# Patient Record
Sex: FEMALE | Race: WHITE | Employment: FULL TIME | URBAN - METROPOLITAN AREA
[De-identification: names, ages, dates, MRNs, and addresses within clinical notes are randomized per-mention and may not be internally consistent; named-entity substitution may affect disease eponyms.]

---

## 2017-07-07 ENCOUNTER — HOSPITAL ENCOUNTER (OUTPATIENT)
Dept: MAMMOGRAPHY | Age: 57
Discharge: HOME | End: 2017-07-07
Payer: COMMERCIAL

## 2017-07-07 DIAGNOSIS — Z12.31 VISIT FOR SCREENING MAMMOGRAM: ICD-10-CM

## 2017-07-07 PROCEDURE — 77067 SCR MAMMO BI INCL CAD: CPT

## 2017-09-18 ENCOUNTER — HOSPITAL ENCOUNTER (OUTPATIENT)
Dept: PHYSICAL THERAPY | Age: 57
Discharge: HOME | End: 2017-09-18
Payer: OTHER MISCELLANEOUS

## 2017-09-18 PROCEDURE — 97165 OT EVAL LOW COMPLEX 30 MIN: CPT

## 2017-09-18 NOTE — PROGRESS NOTES
Joaquín Romo  : 1960 [facility] [place] [id number] [facility], [address], [facility] [address].  [phone]   [phone]         OUTPATIENT OCCUPATIONAL THERAPY: Initial Assessment 2017    ICD-10: Treatment Diagnosis: Pain in left hand (M79.642)Pain in left wrist (M25.532)  Precautions/Allergies:   Review of patient's allergies indicates no known allergies. Fall Risk Score: 0 (? 5 = High Risk)  MD Orders: Evaluate and treat;Stretching and strengthening left thumb and wrist MEDICAL/REFERRING DIAGNOSIS:   Radial styloid tenosynovitis (de quervain) [M65.4]   DATE OF ONSET: 2017   REFERRING PHYSICIAN: David Gunn MD  RETURN PHYSICIAN APPOINTMENT: 2017     INITIAL ASSESSMENT:  Ms. Shannen Byrnes presents with decreased functional use, strength and range of motion of her left hand, wrist and upper extremity that is affecting her independence with activities of daily living and ability to perform job tasks. I feel that Ms. Shannen Byrnes will benefit from skilled occupational therapy to maximize the functional use of her hand, wrist and upper extremity in daily activities and work tasks. PLAN OF CARE:   PROBLEM LIST:  1. Pain in left hand and wrist.  2. Decreased motion in left thumb and wrist.  3. Decreased strength in left hand. INTERVENTIONS PLANNED:  1. Modalities that may include fluidotherapy, paraffin, ultrasound, and light therapy. 2. Therapeutic exercise including a home exercise program.  3. Manual therapy. 4. Therapeutic activities. TREATMENT PLAN:  Effective Dates: 2017 TO 2017. Frequency/Duration: 2 times a week for 6 weeks  GOALS: (Goals have been discussed and agreed upon with patient.)  Short-Term Functional Goals: Time Frame: 4 weeks  1. Decrease pain to 6 to allow patient to perform self care tasks.   2. Increase motion in left thumb and wrist by 5 degrees to improve functional use of upper extremity in ADL activities. 3. Increase strength in left hand by 5 pounds to allow patient to  and lift objects during self care activities. Discharge Goals: Time Frame: 12 weeks  1. Decrease pain to 2 to allow patient to perform all household and work tasks. 2. Increase motion in left thumb and wrist by 10 degreees to allow patient to perform all ADL activities. 3. Increase strength in left hand by 8 pounds to allow patient to , lift, hold, and carry heavy objects. Rehabilitation Potential For Stated Goals: Good  Regarding Lindsay Mak therapy, I certify that the treatment plan above will be carried out by a therapist or under their direction. Thank you for this referral,  Jose Schneider, KATHY       Referring Physician Signature: Brendon Lipscomb MD _________________________  Date _________            The information in this section was collected on 9/18/2017 (except where otherwise noted). OCCUPATIONAL PROFILE & HISTORY:   History of Present Injury/Illness (Reason for Referral): The patient began having increasing pain in her left thumb and wrist over the last 5 months from doing repetitive motion at work. Past Medical History/Comorbidities:   Ms. Manuel Conrad  has a past medical history of Anxiety; Depression; Endometriosis; and Uterine fibroid. Ms. Manuel Conrad  has a past surgical history that includes negrita and bso. Social History/Living Environment:   Home Environment: Private residence  Prior Level of Function/Work/Activity:  Independent  Dominant Side:         RIGHT    Current Medications:    Current Outpatient Prescriptions:     VITAMIN E/QUININE SULFATE (LEG CRAMP TREATMENT PO), Take  by mouth daily. , Disp: , Rfl:     ORGAN CONCENTRATES (ADRENAL PO), Take  by mouth daily. , Disp: , Rfl:    Date Last Reviewed:  9/28/2017   Number of medical conditions (excluding presenting problem) that affect the Plan of Care: Brief history (0):  LOW COMPLEXITY   ASSESSMENT OF OCCUPATIONAL PERFORMANCE:   RANGE OF MOTION:     · AROM: Left thumb motions as follows:MP 0/65, IP 0/35, abduction 35, extension 40 degrees. STRENGTH:   STRENGTH: Right 35 lbs. Left 38 lbs. LAT PINCH: Right 9 lbs. Left 11 lbs. : 3 JAW JADON : Right 10 lbs. Left 12 lbs. SENSATION:  Intact           Physical Skills Involved:  1. Range of Motion  2. Strength  3. Pain (Chronic) Cognitive Skills Affected (resulting in the inability to perform in a timely and safe manner): 1. none Psychosocial Skills Affected:  1. none   Number of elements that affect the Plan of Care: 1-3:  LOW COMPLEXITY   CLINICAL DECISION MAKING:   Outcome Measure: Tool Used: Disabilities of the Arm, Shoulder and Hand (DASH) Questionnaire - Quick Version  Score:  Initial: 33/55  Most Recent: X/55 (Date: -- )   Interpretation of Score: The DASH is designed to measure the activities of daily living in person's with upper extremity dysfunction or pain. Each section is scored on a 1-5 scale, 5 representing the greatest disability. The scores of each section are added together for a total score of 55. Score 11 12-19 20-28 29-37 38-45 46-54 55   Modifier CH CI CJ CK CL CM CN     ? Carrying, Moving, and Handling Objects:     - CURRENT STATUS: CK - 40%-59% impaired, limited or restricted    - GOAL STATUS: CJ - 20%-39% impaired, limited or restricted    - D/C STATUS:  ---------------To be determined---------------      Medical Necessity:   · Patient is expected to demonstrate progress in strength and range of motion to decrease assistance required with ADL,household and work activities. .  Reason for Services/Other Comments:  · Patient has limited motion,strength and function in her left U.E..  Clinical Decision-Making Assessment:     Clinical Decision-Making: LOW COMPLEXITY   TREATMENT:   (In addition to Assessment/Re-Assessment sessions the following treatments were rendered)  Pre-treatment Symptoms/Complaints:  Pain and stiffness in left hand and wrist.  Pain: Initial: Pain Intensity 1: 6 (increasing to 10 when most intense)  Pain Location 1: Hand, Wrist  Pain Orientation 1: Left  Post Session:  5     Patient was instructed in a home exercise program.      Treatment/Session Assessment:    · Response to Treatment:  Patients tolerated treatment well with no complications. Upon completion of treatment, skin condition was normal..  · Compliance with Program/Exercises: Will assess as treatment progresses. · Recommendations/Intent for next treatment session: \"Next visit will focus on advancements to more challenging activities\".   Total Treatment Duration:  OT Patient Time In/Time Out  Time In: 0145  Time Out: [address] [facility], OT

## 2017-09-18 NOTE — PROGRESS NOTES
Ambulatory/Rehab Services H2 Model Falls Risk Assessment    Risk Factor Pts. ·   Confusion/Disorientation/Impulsivity  []    4 ·   Symptomatic Depression  []   2 ·   Altered Elimination  []   1 ·   Dizziness/Vertigo  []   1 ·   Gender (Male)  []   1 ·   Any administered antiepileptics (anticonvulsants):  []   2 ·   Any administered benzodiazepines:  []   1 ·   Visual Impairment (specify):  []   1 ·   Portable Oxygen Use  []   1 ·   Orthostatic ? BP  []   1 ·   History of Recent Falls (within 3 mos.)  []   5     Ability to Rise from Chair (choose one) Pts. ·   Ability to rise in a single movement  [x]   0 ·   Pushes up, successful in one attempt  []   1 ·   Multiple attempts, but successful  []   3 ·   Unable to rise without assistance  []   4   Total: (5 or greater = High Risk) 0     Falls Prevention Plan:   []                Physical Limitations to Exercise (specify):   []                Mobility Assistance Device (type):   []                Exercise/Equipment Adaptation (specify):    ©2010 [facility] [address] Patent #[id number].  Federal Law prohibits the replication, distribution or use without written permission from [facility] [employer]

## 2017-09-21 ENCOUNTER — HOSPITAL ENCOUNTER (OUTPATIENT)
Dept: PHYSICAL THERAPY | Age: 57
Discharge: HOME | End: 2017-09-21
Payer: OTHER MISCELLANEOUS

## 2017-09-21 PROCEDURE — 97110 THERAPEUTIC EXERCISES: CPT

## 2017-09-21 PROCEDURE — 97140 MANUAL THERAPY 1/> REGIONS: CPT

## 2017-09-21 PROCEDURE — 97018 PARAFFIN BATH THERAPY: CPT

## 2017-09-21 NOTE — PROGRESS NOTES
Ally Batista  : 1960 [facility] at [address] [place] [address], [address], [facility] [address].  [phone]   [phone]         OUTPATIENT OCCUPATIONAL THERAPY: Daily Note 2017    ICD-10: Treatment Diagnosis: Pain in left hand (M79.642)Pain in left wrist (M25.532)  Precautions/Allergies:   Review of patient's allergies indicates no known allergies. Fall Risk Score: 0 (? 5 = High Risk)  MD Orders: Evaluate and treat;Stretching and strengthening left thumb and wrist MEDICAL/REFERRING DIAGNOSIS:   Radial styloid tenosynovitis (de quervain) [M65.4]   DATE OF ONSET: 2017   REFERRING PHYSICIAN: Megan Alfred MD  RETURN PHYSICIAN APPOINTMENT: 2017     INITIAL ASSESSMENT:  Ms. Alexander Stevenson presents with decreased functional use, strength and range of motion of her left hand, wrist and upper extremity that is affecting her independence with activities of daily living and ability to perform job tasks. I feel that Ms. Alexander Stevenson will benefit from skilled occupational therapy to maximize the functional use of her hand, wrist and upper extremity in daily activities and work tasks. PLAN OF CARE:   PROBLEM LIST:  1. Pain in left hand and wrist.  2. Decreased motion in left thumb and wrist.  3. Decreased strength in left hand. INTERVENTIONS PLANNED:  1. Modalities that may include fluidotherapy, paraffin, ultrasound, and light therapy. 2. Therapeutic exercise including a home exercise program.  3. Manual therapy. 4. Therapeutic activities. TREATMENT PLAN:  Effective Dates: 2017 TO 2017. Frequency/Duration: 2 times a week for 6 weeks  GOALS: (Goals have been discussed and agreed upon with patient.)  Short-Term Functional Goals: Time Frame: 4 weeks  1. Decrease pain to 6 to allow patient to perform self care tasks. 2. Increase motion in left thumb and wrist by 5 degrees to improve functional use of upper extremity in ADL activities.   3. Increase strength in left hand by 5 pounds to allow patient to  and lift objects during self care activities. Discharge Goals: Time Frame: 12 weeks  1. Decrease pain to 2 to allow patient to perform all household and work tasks. 2. Increase motion in left thumb and wrist by 10 degreees to allow patient to perform all ADL activities. 3. Increase strength in left hand by 8 pounds to allow patient to , lift, hold, and carry heavy objects. Rehabilitation Potential For Stated Goals: Good  Regarding Chrisie Lejesse therapy, I certify that the treatment plan above will be carried out by a therapist or under their direction. Thank you for this referral,  Allayne Apley, OT       Referring Physician Signature: Win Novak MD _________________________  Date _________            The information in this section was collected on 9/18/2017 (except where otherwise noted). OCCUPATIONAL PROFILE & HISTORY:   History of Present Injury/Illness (Reason for Referral): The patient began having increasing pain in her left thumb and wrist over the last 5 months from doing repetitive motion at work. Past Medical History/Comorbidities:   Ms. Shannan Bernabe  has a past medical history of Anxiety; Depression; Endometriosis; and Uterine fibroid. Ms. Shannan Bernabe  has a past surgical history that includes negrita and bso. Social History/Living Environment:      Prior Level of Function/Work/Activity:  Independent  Dominant Side:         RIGHT    Current Medications:    Current Outpatient Prescriptions:     VITAMIN E/QUININE SULFATE (LEG CRAMP TREATMENT PO), Take  by mouth daily. , Disp: , Rfl:     ORGAN CONCENTRATES (ADRENAL PO), Take  by mouth daily. , Disp: , Rfl:    Date Last Reviewed:  9/21/2017   Number of medical conditions (excluding presenting problem) that affect the Plan of Care: Brief history (0):  LOW COMPLEXITY   ASSESSMENT OF OCCUPATIONAL PERFORMANCE:   RANGE OF MOTION:     · AROM: Left thumb motions as follows:MP 0/65, IP 0/35, abduction 35, extension 40 degrees. STRENGTH:   STRENGTH: Right 35 lbs. Left 38 lbs. LAT PINCH: Right 9 lbs. Left 11 lbs. : 3 JAW JADON : Right 10 lbs. Left 12 lbs. SENSATION:  Intact           Physical Skills Involved:  1. Range of Motion  2. Strength  3. Pain (Chronic) Cognitive Skills Affected (resulting in the inability to perform in a timely and safe manner): 1. none Psychosocial Skills Affected:  1. none   Number of elements that affect the Plan of Care: 1-3:  LOW COMPLEXITY   CLINICAL DECISION MAKING:   Outcome Measure: Tool Used: Disabilities of the Arm, Shoulder and Hand (DASH) Questionnaire - Quick Version  Score:  Initial: 33/55  Most Recent: X/55 (Date: -- )   Interpretation of Score: The DASH is designed to measure the activities of daily living in person's with upper extremity dysfunction or pain. Each section is scored on a 1-5 scale, 5 representing the greatest disability. The scores of each section are added together for a total score of 55. Score 11 12-19 20-28 29-37 38-45 46-54 55   Modifier CH CI CJ CK CL CM CN     ? Carrying, Moving, and Handling Objects:     - CURRENT STATUS: CK - 40%-59% impaired, limited or restricted    - GOAL STATUS: CJ - 20%-39% impaired, limited or restricted    - D/C STATUS:  ---------------To be determined---------------      Medical Necessity:   · Patient is expected to demonstrate progress in strength and range of motion to decrease assistance required with ADL,household and work activities. .  Reason for Services/Other Comments:  · Patient has limited motion,strength and function in her left U.E..  Clinical Decision-Making Assessment:     Clinical Decision-Making: LOW COMPLEXITY   TREATMENT:   (In addition to Assessment/Re-Assessment sessions the following treatments were rendered)  Pre-treatment Symptoms/Complaints:  Pain and stiffness in left hand and wrist.  Pain: Initial: Pain Intensity 1: 6  Pain Location 1: Hand, Wrist  Pain Orientation 1: Left  Post Session:  5     Patient was instructed in a home exercise program.  Patient stated \"My hand is about the same. \"    Manual Therapy: (Soft Tissue Mobilization Duration  Duration: 15 Minutes  Duration: 15 Minutes): Technique: Retrograde massage (followed by Light tx)  LUE Soft Tissue Mobilization: Yes  Technique: Retrograde massage (followed by light tx)   Therapeutic Exercise:                                                                               : The patient's home exercise program was reviewed. Date:  9/21/17 Date: Date: Date: Date:   Activity/Exercise Parameters Parameters Parameters Parameters Parameters   AROM during Fluidotherapy 20 min       Paraffin with Stretch   15 min       Retrograde massage, Friction Scar massage, Joint Mobilization   15 min  Light tx       Scarf Curl   2 min       Washer Game 2 min       Individual Gripper          Hand [place]          Cones          Pegs          Clothes Pins          A-R Bar          Exerstick          Velcro-Roll          A&ROM EX 5 min       RESISTIVE EXERCISES Weight/ Sets/Reps   Weight/ Sets/Reps Weight/ Sets/Reps Weight/ Sets/Reps Weight/ Sets/Reps   WEIGHT WELL        Sup/Pro        UD/RD        Wrist Flex/Ext        Free Weights          UBE(Minutes)          Nautilus        Compound Row        Vertical Chest        Overhead Press                    HEP: As above; handouts given to patient for all exercises.     Therapeutic Modalities:         Left Wrist Heat  Type: Paraffin bath  Duration: 15 minutes  Patient Position: Sitting                                     Joint Mobilization:        Treatment Times:  · Therapeutic Exercise: 30 minutes  · Manual Therapy: 15 minutes  · Parafin: 15 minutes  · Whirlpool:  minutes  · Other:  minutes       Treatment/Session Assessment:    · Response to Treatment:  Patients tolerated treatment well with no complications. Upon completion of treatment, skin condition was normal..  · Compliance with Program/Exercises: Will assess as treatment progresses. · Recommendations/Intent for next treatment session: \"Next visit will focus on advancements to more challenging activities\". Will continue per MD.  Total Treatment Duration:  OT Patient Time In/Time Out  Time In: 1386  Time Out: [address] [facility], OT

## 2017-09-25 ENCOUNTER — HOSPITAL ENCOUNTER (OUTPATIENT)
Dept: PHYSICAL THERAPY | Age: 57
Discharge: HOME | End: 2017-09-25
Payer: OTHER MISCELLANEOUS

## 2017-09-25 PROCEDURE — 97110 THERAPEUTIC EXERCISES: CPT

## 2017-09-25 PROCEDURE — 97018 PARAFFIN BATH THERAPY: CPT

## 2017-09-25 PROCEDURE — 97140 MANUAL THERAPY 1/> REGIONS: CPT

## 2017-09-26 NOTE — PROGRESS NOTES
Steve Corley  : 1960 [facility] [place] [address] [facility], [address], [facility] [address].  [phone]   [phone]         OUTPATIENT OCCUPATIONAL THERAPY: Daily Note 2017   ICD-10: Treatment Diagnosis: Pain in left hand (M79.642)Pain in left wrist (M25.532)  Precautions/Allergies:   Review of patient's allergies indicates no known allergies. Fall Risk Score: 0 (? 5 = High Risk)  MD Orders: Evaluate and treat;Stretching and strengthening left thumb and wrist MEDICAL/REFERRING DIAGNOSIS:   Radial styloid tenosynovitis (de quervain) [M65.4]   DATE OF ONSET: 2017   REFERRING PHYSICIAN: Jose Redding MD  RETURN PHYSICIAN APPOINTMENT: 2017     INITIAL ASSESSMENT:  Ms. Jaci Angel presents with decreased functional use, strength and range of motion of her left hand, wrist and upper extremity that is affecting her independence with activities of daily living and ability to perform job tasks. I feel that Ms. Jaci Angel will benefit from skilled occupational therapy to maximize the functional use of her hand, wrist and upper extremity in daily activities and work tasks. PLAN OF CARE:   PROBLEM LIST:  1. Pain in left hand and wrist.  2. Decreased motion in left thumb and wrist.  3. Decreased strength in left hand. INTERVENTIONS PLANNED:  1. Modalities that may include fluidotherapy, paraffin, ultrasound, and light therapy. 2. Therapeutic exercise including a home exercise program.  3. Manual therapy. 4. Therapeutic activities. TREATMENT PLAN:  Effective Dates: 2017 TO 2017. Frequency/Duration: 2 times a week for 6 weeks  GOALS: (Goals have been discussed and agreed upon with patient.)  Short-Term Functional Goals: Time Frame: 4 weeks  1. Decrease pain to 6 to allow patient to perform self care tasks. 2. Increase motion in left thumb and wrist by 5 degrees to improve functional use of upper extremity in ADL activities.   3. Increase strength in left hand by 5 pounds to allow patient to  and lift objects during self care activities. Discharge Goals: Time Frame: 12 weeks  1. Decrease pain to 2 to allow patient to perform all household and work tasks. 2. Increase motion in left thumb and wrist by 10 degreees to allow patient to perform all ADL activities. 3. Increase strength in left hand by 8 pounds to allow patient to , lift, hold, and carry heavy objects. Rehabilitation Potential For Stated Goals: Good  Regarding Scar Vaughn therapy, I certify that the treatment plan above will be carried out by a therapist or under their direction. Thank you for this referral,  Adam Espinoza, KATHY       Referring Physician Signature: Anette Samson., MD _________________________  Date _________            The information in this section was collected on 9/18/2017 (except where otherwise noted). OCCUPATIONAL PROFILE & HISTORY:   History of Present Injury/Illness (Reason for Referral): The patient began having increasing pain in her left thumb and wrist over the last 5 months from doing repetitive motion at work. Past Medical History/Comorbidities:   Ms. Emmanuel Auguste  has a past medical history of Anxiety; Depression; Endometriosis; and Uterine fibroid. Ms. Emmanuel Auguste  has a past surgical history that includes negrita and bso. Social History/Living Environment:      Prior Level of Function/Work/Activity:  Independent  Dominant Side:         RIGHT    Current Medications:    Current Outpatient Prescriptions:     VITAMIN E/QUININE SULFATE (LEG CRAMP TREATMENT PO), Take  by mouth daily. , Disp: , Rfl:     ORGAN CONCENTRATES (ADRENAL PO), Take  by mouth daily. , Disp: , Rfl:    Date Last Reviewed:  9/25/2017   Number of medical conditions (excluding presenting problem) that affect the Plan of Care: Brief history (0):  LOW COMPLEXITY   ASSESSMENT OF OCCUPATIONAL PERFORMANCE:   RANGE OF MOTION:     · AROM: Left thumb motions as follows:MP 0/65, IP 0/35, abduction 35, extension 40 degrees. STRENGTH:   STRENGTH: Right 35 lbs. Left 38 lbs. LAT PINCH: Right 9 lbs. Left 11 lbs. : 3 JAW JADON : Right 10 lbs. Left 12 lbs. SENSATION:  Intact           Physical Skills Involved:  1. Range of Motion  2. Strength  3. Pain (Chronic) Cognitive Skills Affected (resulting in the inability to perform in a timely and safe manner): 1. none Psychosocial Skills Affected:  1. none   Number of elements that affect the Plan of Care: 1-3:  LOW COMPLEXITY   CLINICAL DECISION MAKING:   Outcome Measure: Tool Used: Disabilities of the Arm, Shoulder and Hand (DASH) Questionnaire - Quick Version  Score:  Initial: 33/55  Most Recent: X/55 (Date: -- )   Interpretation of Score: The DASH is designed to measure the activities of daily living in person's with upper extremity dysfunction or pain. Each section is scored on a 1-5 scale, 5 representing the greatest disability. The scores of each section are added together for a total score of 55. Score 11 12-19 20-28 29-37 38-45 46-54 55   Modifier CH CI CJ CK CL CM CN     ? Carrying, Moving, and Handling Objects:     - CURRENT STATUS: CK - 40%-59% impaired, limited or restricted    - GOAL STATUS: CJ - 20%-39% impaired, limited or restricted    - D/C STATUS:  ---------------To be determined---------------      Medical Necessity:   · Patient is expected to demonstrate progress in strength and range of motion to decrease assistance required with ADL,household and work activities. .  Reason for Services/Other Comments:  · Patient has limited motion,strength and function in her left U.E..  Clinical Decision-Making Assessment:     Clinical Decision-Making: LOW COMPLEXITY   TREATMENT:   (In addition to Assessment/Re-Assessment sessions the following treatments were rendered)  Pre-treatment Symptoms/Complaints:  Pain and stiffness in left hand and wrist.  Pain: Initial: Pain Intensity 1: 5  Pain Location 1: Hand, Wrist  Pain Orientation 1: Left  Post Session:  2     Patient was instructed in a home exercise program.  Patient stated \"I see my doctor Thursday. \"    Manual Therapy: (Soft Tissue Mobilization Duration  Duration: 15 Minutes  Duration: 15 Minutes): Technique: Retrograde massage (followed by light tx)  LUE Soft Tissue Mobilization: Yes  Technique: Retrograde massage   Therapeutic Exercise:                                                                               : The patient's home exercise program was reviewed. Date:  9/21/17 Date:  9/25/17 Date: Date: Date:   Activity/Exercise Parameters Parameters Parameters Parameters Parameters   AROM during Fluidotherapy 20 min 20 min      Paraffin with Stretch   15 min 15 min      Retrograde massage, Friction Scar massage, Joint Mobilization   15 min  Light tx 15 min      Scarf Curl   2 min 2 min      Washer Game 2 min 3 min      Individual Gripper          Hand [place]          Cones          Pegs          Clothes Pins          A-R Bar          Exerstick          Velcro-Roll          A&ROM EX 5 min 5 min      RESISTIVE EXERCISES Weight/ Sets/Reps   Weight/ Sets/Reps Weight/ Sets/Reps Weight/ Sets/Reps Weight/ Sets/Reps   WEIGHT WELL        Sup/Pro        UD/RD        Wrist Flex/Ext        Free Weights          UBE(Minutes)          Nautilus        Compound Row        Vertical Chest        Overhead Press                    HEP: As above; handouts given to patient for all exercises.     Therapeutic Modalities:         Left Wrist Heat  Type: Paraffin bath  Duration: 15 minutes  Patient Position: Sitting                                     Joint Mobilization:        Treatment Times:  · Therapeutic Exercise: 30 minutes  · Manual Therapy: 15 minutes  · Parafin: 15 minutes  · Whirlpool:  minutes  · Other:  minutes       Treatment/Session Assessment:    · Response to Treatment:  Patients tolerated treatment well with no complications. Upon completion of treatment, skin condition was normal..  · Compliance with Program/Exercises: Will assess as treatment progresses. · Recommendations/Intent for next treatment session: \"Next visit will focus on advancements to more challenging activities\"MD progress note completed. .Will continue per MD.  Total Treatment Duration:  OT Patient Time In/Time Out  Time In: 0828  Time Out: [address], OT

## 2017-09-28 ENCOUNTER — APPOINTMENT (OUTPATIENT)
Dept: PHYSICAL THERAPY | Age: 57
End: 2017-09-28

## 2017-10-02 ENCOUNTER — HOSPITAL ENCOUNTER (OUTPATIENT)
Dept: PHYSICAL THERAPY | Age: 57
Discharge: HOME | End: 2017-10-02
Payer: OTHER MISCELLANEOUS

## 2017-10-02 PROCEDURE — 97140 MANUAL THERAPY 1/> REGIONS: CPT

## 2017-10-02 PROCEDURE — 97018 PARAFFIN BATH THERAPY: CPT

## 2017-10-02 PROCEDURE — 97110 THERAPEUTIC EXERCISES: CPT

## 2017-10-02 NOTE — PROGRESS NOTES
Vlad Mehta  : 1960 [facility] [place] [address] [facility], [address], [facility].  [phone]   [phone]         OUTPATIENT OCCUPATIONAL THERAPY: Daily Note 10/2/2017    ICD-10: Treatment Diagnosis: Pain in left hand (M79.642)Pain in left wrist (M25.532)  Precautions/Allergies:   Review of patient's allergies indicates no known allergies. Fall Risk Score: 0 (? 5 = High Risk)  MD Orders: Evaluate and treat;Stretching and strengthening left thumb and wrist MEDICAL/REFERRING DIAGNOSIS:   Radial styloid tenosynovitis (de quervain) [M65.4]   DATE OF ONSET: 2017   REFERRING PHYSICIAN: Peggy Victoria MD  RETURN PHYSICIAN APPOINTMENT: 2017     INITIAL ASSESSMENT:  Ms. Kristan Bowles presents with decreased functional use, strength and range of motion of her left hand, wrist and upper extremity that is affecting her independence with activities of daily living and ability to perform job tasks. I feel that Ms. Kristan Bowles will benefit from skilled occupational therapy to maximize the functional use of her hand, wrist and upper extremity in daily activities and work tasks. PLAN OF CARE:   PROBLEM LIST:  1. Pain in left hand and wrist.  2. Decreased motion in left thumb and wrist.  3. Decreased strength in left hand. INTERVENTIONS PLANNED:  1. Modalities that may include fluidotherapy, paraffin, ultrasound, and light therapy. 2. Therapeutic exercise including a home exercise program.  3. Manual therapy. 4. Therapeutic activities. TREATMENT PLAN:  Effective Dates: 2017 TO 2017. Frequency/Duration: 2 times a week for 6 weeks  GOALS: (Goals have been discussed and agreed upon with patient.)  Short-Term Functional Goals: Time Frame: 4 weeks  1. Decrease pain to 6 to allow patient to perform self care tasks. 2. Increase motion in left thumb and wrist by 5 degrees to improve functional use of upper extremity in ADL activities.   3. Increase strength in left hand by 5 pounds to allow patient to  and lift objects during self care activities. Discharge Goals: Time Frame: 12 weeks  1. Decrease pain to 2 to allow patient to perform all household and work tasks. 2. Increase motion in left thumb and wrist by 10 degreees to allow patient to perform all ADL activities. 3. Increase strength in left hand by 8 pounds to allow patient to , lift, hold, and carry heavy objects. Rehabilitation Potential For Stated Goals: Good  [facility], I certify that the treatment plan above will be carried out by a therapist or under their direction. Thank you for this referral,  Wing Roldan, OT       Referring Physician Signature: Henny Gar MD _________________________  Date _________            The information in this section was collected on 9/18/2017 (except where otherwise noted). OCCUPATIONAL PROFILE & HISTORY:   History of Present Injury/Illness (Reason for Referral): The patient began having increasing pain in her left thumb and wrist over the last 5 months from doing repetitive motion at work. Past Medical History/Comorbidities:   Ms. Kimberly Gonzalez  has a past medical history of Anxiety; Depression; Endometriosis; and Uterine fibroid. Ms. Kimberly Gonzalez  has a past surgical history that includes negrita and bso. Social History/Living Environment:      Prior Level of Function/Work/Activity:  Independent  Dominant Side:         RIGHT    Current Medications:    Current Outpatient Prescriptions:     VITAMIN E/QUININE SULFATE (LEG CRAMP TREATMENT PO), Take  by mouth daily. , Disp: , Rfl:     ORGAN CONCENTRATES (ADRENAL PO), Take  by mouth daily. , Disp: , Rfl:    Date Last Reviewed: 10/2/2017    Number of medical conditions (excluding presenting problem) that affect the Plan of Care: Brief history (0):  LOW COMPLEXITY   ASSESSMENT OF OCCUPATIONAL PERFORMANCE:   RANGE OF MOTION:     · AROM: Left thumb motions as follows:MP 0/65, IP 0/35, abduction 35, extension 40 degrees. STRENGTH:   STRENGTH: Right 35 lbs. Left 38 lbs. LAT PINCH: Right 9 lbs. Left 11 lbs. : 3 JAW JADON : Right 10 lbs. Left 12 lbs. SENSATION:  Intact           Physical Skills Involved:  1. Range of Motion  2. Strength  3. Pain (Chronic) Cognitive Skills Affected (resulting in the inability to perform in a timely and safe manner): 1. none Psychosocial Skills Affected:  1. none   Number of elements that affect the Plan of Care: 1-3:  LOW COMPLEXITY   CLINICAL DECISION MAKING:   Outcome Measure: Tool Used: Disabilities of the Arm, Shoulder and Hand (DASH) Questionnaire - Quick Version  Score:  Initial: 33/55  Most Recent: X/55 (Date: -- )   Interpretation of Score: The DASH is designed to measure the activities of daily living in person's with upper extremity dysfunction or pain. Each section is scored on a 1-5 scale, 5 representing the greatest disability. The scores of each section are added together for a total score of 55. Score 11 12-19 20-28 29-37 38-45 46-54 55   Modifier CH CI CJ CK CL CM CN     ? Carrying, Moving, and Handling Objects:     - CURRENT STATUS: CK - 40%-59% impaired, limited or restricted    - GOAL STATUS: CJ - 20%-39% impaired, limited or restricted    - D/C STATUS:  ---------------To be determined---------------      Medical Necessity:   · Patient is expected to demonstrate progress in strength and range of motion to decrease assistance required with ADL,household and work activities. .  Reason for Services/Other Comments:  · Patient has limited motion,strength and function in her left U.E..  Clinical Decision-Making Assessment:     Clinical Decision-Making: LOW COMPLEXITY   TREATMENT:   (In addition to Assessment/Re-Assessment sessions the following treatments were rendered)  Pre-treatment Symptoms/Complaints:  Pain and stiffness in left hand and wrist.  Pain: Initial: Pain Intensity 1: 4  Pain Location 1: Hand, Wrist  Pain Orientation 1: Left  Post Session:  2     Patient was instructed in a home exercise program.  Patient stated \"My pain is a little less today\"    Manual Therapy: (Soft Tissue Mobilization Duration  Duration: 15 Minutes  Duration: 15 Minutes): Technique: Retrograde massage (followed by light tx)  LUE Soft Tissue Mobilization: Yes  Technique: Retrograde massage   Therapeutic Exercise:                                                                               : The patient's home exercise program was reviewed. Date:  9/21/17 Date:  9/25/17 Date:  10/2/17 Date: Date:   Activity/Exercise Parameters Parameters Parameters Parameters Parameters   AROM during Fluidotherapy 20 min 20 min 20 min     Paraffin with Stretch   15 min 15 min 15 min     Retrograde massage, Friction Scar massage, Joint Mobilization   15 min  Light tx 15 min 15 min  Light tx     Scarf Curl   2 min 2 min 2 min     Washer Game 2 min 3 min 3 min     Individual Gripper          Hand [place]          Cones          Pegs          Clothes Pins          A-R Bar          Exerstick          Velcro-Roll          A&ROM EX 5 min 5 min 5 min     RESISTIVE EXERCISES Weight/ Sets/Reps   Weight/ Sets/Reps Weight/ Sets/Reps Weight/ Sets/Reps Weight/ Sets/Reps   WEIGHT WELL        Sup/Pro        UD/RD        Wrist Flex/Ext        Free Weights          UBE(Minutes)          Nautilus        Compound Row        Vertical Chest        Overhead Press                    HEP: As above; handouts given to patient for all exercises.     Therapeutic Modalities:         Left Wrist Heat  Type: Paraffin bath  Duration: 15 minutes  Patient Position: Sitting                                     Joint Mobilization:        Treatment Times:  · Therapeutic Exercise: 30 minutes  · Manual Therapy: 15 minutes  · Parafin: 15 minutes  · Whirlpool:  minutes  · Other:  minutes       Treatment/Session Assessment: · Response to Treatment:  Patients tolerated treatment well with no complications. Upon completion of treatment, skin condition was normal..  · Compliance with Program/Exercises: Will assess as treatment progresses. · Recommendations/Intent for next treatment session: \"Next visit will focus on advancements to more challenging activities\". .Will continue per MD.\"  Total Treatment Duration:  OT Patient Time In/Time Out  Time In: 1773  Time Out: [address] [facility], OT

## 2017-10-05 ENCOUNTER — HOSPITAL ENCOUNTER (OUTPATIENT)
Dept: PHYSICAL THERAPY | Age: 57
Discharge: HOME | End: 2017-10-05
Payer: OTHER MISCELLANEOUS

## 2017-10-05 PROCEDURE — 97140 MANUAL THERAPY 1/> REGIONS: CPT

## 2017-10-05 PROCEDURE — 97018 PARAFFIN BATH THERAPY: CPT

## 2017-10-05 PROCEDURE — 97110 THERAPEUTIC EXERCISES: CPT

## 2017-10-05 NOTE — PROGRESS NOTES
Vlad Tyson  : 1960 [facility] at [address] [place] [address], [address], [address] [facility]  [phone]   [phone]         OUTPATIENT OCCUPATIONAL THERAPY: Daily Note 10/5/2017    ICD-10: Treatment Diagnosis: Pain in left hand (M79.642)Pain in left wrist (M25.532)  Precautions/Allergies:   Review of patient's allergies indicates no known allergies. Fall Risk Score: 0 (? 5 = High Risk)  MD Orders: Evaluate and treat;Stretching and strengthening left thumb and wrist MEDICAL/REFERRING DIAGNOSIS:   Radial styloid tenosynovitis (de quervain) [M65.4]   DATE OF ONSET: 2017   REFERRING PHYSICIAN: Peggy Victoria MD  RETURN PHYSICIAN APPOINTMENT: 2017     INITIAL ASSESSMENT:  Ms. Kristan Bowles presents with decreased functional use, strength and range of motion of her left hand, wrist and upper extremity that is affecting her independence with activities of daily living and ability to perform job tasks. I feel that Ms. Kristan Bowles will benefit from skilled occupational therapy to maximize the functional use of her hand, wrist and upper extremity in daily activities and work tasks. PLAN OF CARE:   PROBLEM LIST:  1. Pain in left hand and wrist.  2. Decreased motion in left thumb and wrist.  3. Decreased strength in left hand. INTERVENTIONS PLANNED:  1. Modalities that may include fluidotherapy, paraffin, ultrasound, and light therapy. 2. Therapeutic exercise including a home exercise program.  3. Manual therapy. 4. Therapeutic activities. TREATMENT PLAN:  Effective Dates: 2017 TO 2017. Frequency/Duration: 2 times a week for 6 weeks  GOALS: (Goals have been discussed and agreed upon with patient.)  Short-Term Functional Goals: Time Frame: 4 weeks  1. Decrease pain to 6 to allow patient to perform self care tasks. 2. Increase motion in left thumb and wrist by 5 degrees to improve functional use of upper extremity in ADL activities.   3. Increase strength in left hand by 5 pounds to allow patient to  and lift objects during self care activities. Discharge Goals: Time Frame: 12 weeks  1. Decrease pain to 2 to allow patient to perform all household and work tasks. 2. Increase motion in left thumb and wrist by 10 degreees to allow patient to perform all ADL activities. 3. Increase strength in left hand by 8 pounds to allow patient to , lift, hold, and carry heavy objects. Rehabilitation Potential For Stated Goals: Good  Regarding Payam Drum therapy, I certify that the treatment plan above will be carried out by a therapist or under their direction. Thank you for this referral,  Uma Wadsworth OT       Referring Physician Signature: Fam Tellez MD _________________________  Date _________            The information in this section was collected on 9/18/2017 (except where otherwise noted). OCCUPATIONAL PROFILE & HISTORY:   History of Present Injury/Illness (Reason for Referral): The patient began having increasing pain in her left thumb and wrist over the last 5 months from doing repetitive motion at work. Past Medical History/Comorbidities:   Ms. Yue Honeycutt  has a past medical history of Anxiety; Depression; Endometriosis; and Uterine fibroid. Ms. Yue Honeycutt  has a past surgical history that includes negrita and bso. Social History/Living Environment:      Prior Level of Function/Work/Activity:  Independent  Dominant Side:         RIGHT    Current Medications:    Current Outpatient Prescriptions:     VITAMIN E/QUININE SULFATE (LEG CRAMP TREATMENT PO), Take  by mouth daily. , Disp: , Rfl:     ORGAN CONCENTRATES (ADRENAL PO), Take  by mouth daily. , Disp: , Rfl:    Date Last Reviewed: 10/5/2017    Number of medical conditions (excluding presenting problem) that affect the Plan of Care: Brief history (0):  LOW COMPLEXITY   ASSESSMENT OF OCCUPATIONAL PERFORMANCE:   RANGE OF MOTION:     · AROM: Left thumb motions as follows:MP 0/65, IP 0/35, abduction 35, extension 40 degrees. STRENGTH:   STRENGTH: Right 35 lbs. Left 38 lbs. LAT PINCH: Right 9 lbs. Left 11 lbs. : 3 JAW JADON : Right 10 lbs. Left 12 lbs. SENSATION:  Intact           Physical Skills Involved:  1. Range of Motion  2. Strength  3. Pain (Chronic) Cognitive Skills Affected (resulting in the inability to perform in a timely and safe manner): 1. none Psychosocial Skills Affected:  1. none   Number of elements that affect the Plan of Care: 1-3:  LOW COMPLEXITY   CLINICAL DECISION MAKING:   Outcome Measure: Tool Used: Disabilities of the Arm, Shoulder and Hand (DASH) Questionnaire - Quick Version  Score:  Initial: 33/55  Most Recent: X/55 (Date: -- )   Interpretation of Score: The DASH is designed to measure the activities of daily living in person's with upper extremity dysfunction or pain. Each section is scored on a 1-5 scale, 5 representing the greatest disability. The scores of each section are added together for a total score of 55. Score 11 12-19 20-28 29-37 38-45 46-54 55   Modifier CH CI CJ CK CL CM CN     ? Carrying, Moving, and Handling Objects:     - CURRENT STATUS: CK - 40%-59% impaired, limited or restricted    - GOAL STATUS: CJ - 20%-39% impaired, limited or restricted    - D/C STATUS:  ---------------To be determined---------------      Medical Necessity:   · Patient is expected to demonstrate progress in strength and range of motion to decrease assistance required with ADL,household and work activities. .  Reason for Services/Other Comments:  · Patient has limited motion,strength and function in her left U.E..  Clinical Decision-Making Assessment:     Clinical Decision-Making: LOW COMPLEXITY   TREATMENT:   (In addition to Assessment/Re-Assessment sessions the following treatments were rendered)  Pre-treatment Symptoms/Complaints:  Pain and stiffness in left hand and wrist.  Pain: Initial: Pain Intensity 1: 3  Pain Location 1: Hand, Wrist  Pain Orientation 1: Left  Post Session:  2     Patient was instructed in a home exercise program.  Patient stated \"I am doing well today, I did not work. \"    Manual Therapy: (Soft Tissue Mobilization Duration  Duration: 15 Minutes  Duration: 15 Minutes): Technique: Retrograde massage (followed by Light tx)  LUE Soft Tissue Mobilization: Yes  Technique: Retrograde massage (folowed by Light tx)   Therapeutic Exercise:                                                                               : The patient's home exercise program was reviewed. Date:  9/21/17 Date:  9/25/17 Date:  10/2/17 Date:  10/5/17 Date:   Activity/Exercise Parameters Parameters Parameters Parameters Parameters   AROM during Fluidotherapy 20 min 20 min 20 min 20 min    Paraffin with Stretch   15 min 15 min 15 min 15 min    Retrograde massage, Friction Scar massage, Joint Mobilization   15 min  Light tx 15 min 15 min  Light tx 15 min  Light tx    Scarf Curl   2 min 2 min 2 min 2 min    Washer Game 2 min 3 min 3 min 3 min    Individual Gripper          Hand [place]          Cones          Pegs          Clothes Pins          A-R Bar          Exerstick          Velcro-Roll          A&ROM EX 5 min 5 min 5 min 5 min    RESISTIVE EXERCISES Weight/ Sets/Reps   Weight/ Sets/Reps Weight/ Sets/Reps Weight/ Sets/Reps Weight/ Sets/Reps   WEIGHT WELL        Sup/Pro        UD/RD        Wrist Flex/Ext        Free Weights          UBE(Minutes)          Nautilus        Compound Row        Vertical Chest        Overhead Press                    HEP: As above; handouts given to patient for all exercises.     Therapeutic Modalities:         Left Wrist Heat  Type: Paraffin bath  Duration: 15 minutes  Patient Position: Sitting                                     Joint Mobilization:        Treatment Times:  · Therapeutic Exercise: 30 minutes  · Manual Therapy: 15 minutes  · Parafin: 15 minutes  · Whirlpool:  minutes  · Other:  minutes       Treatment/Session Assessment:    · Response to Treatment:  Patients tolerated treatment well with no complications. Upon completion of treatment, skin condition was normal..  · Compliance with Program/Exercises: Will assess as treatment progresses. · Recommendations/Intent for next treatment session: \"Next visit will focus on advancements to more challenging activities\". .Will continue per MD.\"  Total Treatment Duration:  OT Patient Time In/Time Out  Time In: 0300  Time Out: [address], OT

## 2017-10-11 ENCOUNTER — HOSPITAL ENCOUNTER (OUTPATIENT)
Dept: PHYSICAL THERAPY | Age: 57
Discharge: HOME | End: 2017-10-11
Payer: OTHER MISCELLANEOUS

## 2017-10-11 PROCEDURE — 97110 THERAPEUTIC EXERCISES: CPT

## 2017-10-11 PROCEDURE — 97018 PARAFFIN BATH THERAPY: CPT

## 2017-10-11 PROCEDURE — 97140 MANUAL THERAPY 1/> REGIONS: CPT

## 2017-10-12 ENCOUNTER — HOSPITAL ENCOUNTER (OUTPATIENT)
Dept: PHYSICAL THERAPY | Age: 57
Discharge: HOME | End: 2017-10-12
Payer: OTHER MISCELLANEOUS

## 2017-10-12 PROCEDURE — 97110 THERAPEUTIC EXERCISES: CPT

## 2017-10-12 PROCEDURE — 97140 MANUAL THERAPY 1/> REGIONS: CPT

## 2017-10-12 PROCEDURE — 97018 PARAFFIN BATH THERAPY: CPT

## 2017-10-12 NOTE — PROGRESS NOTES
Jackeline Garcia  : 1960 [facility] [place] [id number] [facility], [address], [address] [facility]  [phone]   [phone]         OUTPATIENT OCCUPATIONAL THERAPY: Daily Note 10/11/2017    ICD-10: Treatment Diagnosis: Pain in left hand (M79.642)Pain in left wrist (M25.532)  Precautions/Allergies:   Review of patient's allergies indicates no known allergies. Fall Risk Score: 0 (? 5 = High Risk)  MD Orders: Evaluate and treat;Stretching and strengthening left thumb and wrist MEDICAL/REFERRING DIAGNOSIS:   Radial styloid tenosynovitis (de quervain) [M65.4]   DATE OF ONSET: 2017   REFERRING PHYSICIAN: Kina Hernandez MD  RETURN PHYSICIAN APPOINTMENT: 2017     INITIAL ASSESSMENT:  Ms. Óscar Bruce presents with decreased functional use, strength and range of motion of her left hand, wrist and upper extremity that is affecting her independence with activities of daily living and ability to perform job tasks. I feel that Ms. Óscar Bruce will benefit from skilled occupational therapy to maximize the functional use of her hand, wrist and upper extremity in daily activities and work tasks. PLAN OF CARE:   PROBLEM LIST:  1. Pain in left hand and wrist.  2. Decreased motion in left thumb and wrist.  3. Decreased strength in left hand. INTERVENTIONS PLANNED:  1. Modalities that may include fluidotherapy, paraffin, ultrasound, and light therapy. 2. Therapeutic exercise including a home exercise program.  3. Manual therapy. 4. Therapeutic activities. TREATMENT PLAN:  Effective Dates: 2017 TO 2017. Frequency/Duration: 2 times a week for 6 weeks  GOALS: (Goals have been discussed and agreed upon with patient.)  Short-Term Functional Goals: Time Frame: 4 weeks  1. Decrease pain to 6 to allow patient to perform self care tasks. 2. Increase motion in left thumb and wrist by 5 degrees to improve functional use of upper extremity in ADL activities.   3. Increase strength in left hand by 5 pounds to allow patient to  and lift objects during self care activities. Discharge Goals: Time Frame: 12 weeks  1. Decrease pain to 2 to allow patient to perform all household and work tasks. 2. Increase motion in left thumb and wrist by 10 degreees to allow patient to perform all ADL activities. 3. Increase strength in left hand by 8 pounds to allow patient to , lift, hold, and carry heavy objects. Rehabilitation Potential For Stated Goals: Good  Regarding [facility] therapy, I certify that the treatment plan above will be carried out by a therapist or under their direction. Thank you for this referral,  Josafat Fernandes, OT       Referring Physician Signature: Zeke Parsons MD _________________________  Date _________            The information in this section was collected on 9/18/2017 (except where otherwise noted). OCCUPATIONAL PROFILE & HISTORY:   History of Present Injury/Illness (Reason for Referral): The patient began having increasing pain in her left thumb and wrist over the last 5 months from doing repetitive motion at work. Past Medical History/Comorbidities:   Ms. Patty Clifton  has a past medical history of Anxiety; Depression; Endometriosis; and Uterine fibroid. Ms. Patty Clifton  has a past surgical history that includes negrita and bso. Social History/Living Environment:      Prior Level of Function/Work/Activity:  Independent  Dominant Side:         RIGHT    Current Medications:    Current Outpatient Prescriptions:     VITAMIN E/QUININE SULFATE (LEG CRAMP TREATMENT PO), Take  by mouth daily. , Disp: , Rfl:     ORGAN CONCENTRATES (ADRENAL PO), Take  by mouth daily. , Disp: , Rfl:    Date Last Reviewed: 10/11/2017    Number of medical conditions (excluding presenting problem) that affect the Plan of Care: Brief history (0):  LOW COMPLEXITY   ASSESSMENT OF OCCUPATIONAL PERFORMANCE:   RANGE OF MOTION:     · AROM: Left thumb motions as follows:MP 0/65, IP 0/35, abduction 35, extension 40 degrees. STRENGTH:   STRENGTH: Right 35 lbs. Left 38 lbs. LAT PINCH: Right 9 lbs. Left 11 lbs. : 3 JAW JADON : Right 10 lbs. Left 12 lbs. SENSATION:  Intact           Physical Skills Involved:  1. Range of Motion  2. Strength  3. Pain (Chronic) Cognitive Skills Affected (resulting in the inability to perform in a timely and safe manner): 1. none Psychosocial Skills Affected:  1. none   Number of elements that affect the Plan of Care: 1-3:  LOW COMPLEXITY   CLINICAL DECISION MAKING:   Outcome Measure: Tool Used: Disabilities of the Arm, Shoulder and Hand (DASH) Questionnaire - Quick Version  Score:  Initial: 33/55  Most Recent: X/55 (Date: -- )   Interpretation of Score: The DASH is designed to measure the activities of daily living in person's with upper extremity dysfunction or pain. Each section is scored on a 1-5 scale, 5 representing the greatest disability. The scores of each section are added together for a total score of 55. Score 11 12-19 20-28 29-37 38-45 46-54 55   Modifier CH CI CJ CK CL CM CN     ? Carrying, Moving, and Handling Objects:     - CURRENT STATUS: CK - 40%-59% impaired, limited or restricted    - GOAL STATUS: CJ - 20%-39% impaired, limited or restricted    - D/C STATUS:  ---------------To be determined---------------      Medical Necessity:   · Patient is expected to demonstrate progress in strength and range of motion to decrease assistance required with ADL,household and work activities. .  Reason for Services/Other Comments:  · Patient has limited motion,strength and function in her left U.E..  Clinical Decision-Making Assessment:     Clinical Decision-Making: LOW COMPLEXITY   TREATMENT:   (In addition to Assessment/Re-Assessment sessions the following treatments were rendered)  Pre-treatment Symptoms/Complaints:  Pain and stiffness in left hand and wrist.  Pain: Initial: Pain Intensity 1: 3  Pain Location 1: Hand, Wrist  Pain Orientation 1: Left  Post Session:  2     Patient was instructed in a home exercise program.  Patient stated \"I am doing pretty well today. Oridavid Henry \"    Manual Therapy: (Soft Tissue Mobilization Duration  Duration: 15 Minutes  Duration: 15 Minutes): Technique: Retrograde massage (followed by Light tx)  LUE Soft Tissue Mobilization: Yes  Technique: Retrograde massage (followed by Light tx)   Therapeutic Exercise:                                                                               : The patient's home exercise program was reviewed. Date:  9/21/17 Date:  9/25/17 Date:  10/2/17 Date:  10/5/17 Date:  10/11/17   Activity/Exercise Parameters Parameters Parameters Parameters Parameters   AROM during Fluidotherapy 20 min 20 min 20 min 20 min 20 min   Paraffin with Stretch   15 min 15 min 15 min 15 min 15 min   Retrograde massage, Friction Scar massage, Joint Mobilization   15 min  Light tx 15 min 15 min  Light tx 15 min  Light tx 15 min  Light tx   Scarf Curl   2 min 2 min 2 min 2 min 2 min   Washer Game 2 min 3 min 3 min 3 min 3 min   Individual Gripper       20 reps   Hand [place]       20 reps   Cones          Pegs          Clothes Pins       15 reps   A-R Bar          Exerstick          Velcro-Roll          A&ROM EX 5 min 5 min 5 min 5 min 5 min   RESISTIVE EXERCISES Weight/ Sets/Reps   Weight/ Sets/Reps Weight/ Sets/Reps Weight/ Sets/Reps Weight/ Sets/Reps   WEIGHT WELL        Sup/Pro        UD/RD        Wrist Flex/Ext        Free Weights          UBE(Minutes)          Nautilus        Compound Row        Vertical Chest        Overhead Press                    HEP: As above; handouts given to patient for all exercises.     Therapeutic Modalities:         Left Wrist Heat  Type: Paraffin bath  Duration: 15 minutes  Patient Position: Sitting                                     Joint Mobilization:        Treatment Times:  · Therapeutic Exercise: 30 minutes  · Manual Therapy: 15 minutes  · Parafin: 15 minutes  · Whirlpool:  minutes  · Other:  minutes       Treatment/Session Assessment:    · Response to Treatment:  Patients tolerated treatment well with no complications. Upon completion of treatment, skin condition was normal..  · Compliance with Program/Exercises: Will assess as treatment progresses. · Recommendations/Intent for next treatment session: \"Next visit will focus on advancements to more challenging activities\". .Will continue per MD.\"  Total Treatment Duration:  OT Patient Time In/Time Out  Time In: 2283  Time Out: [address],

## 2017-10-12 NOTE — PROGRESS NOTES
Julio Lackey  : 1960 [facility] [place] [address] [facility], [address], [facility].  [phone]   [phone]         OUTPATIENT OCCUPATIONAL THERAPY: Daily Note 10/12/2017    ICD-10: Treatment Diagnosis: Pain in left hand (M79.642)Pain in left wrist (M25.532)  Precautions/Allergies:   Review of patient's allergies indicates no known allergies. Fall Risk Score: 0 (? 5 = High Risk)  MD Orders: Evaluate and treat;Stretching and strengthening left thumb and wrist MEDICAL/REFERRING DIAGNOSIS:   Radial styloid tenosynovitis (de quervain) [M65.4]   DATE OF ONSET: 2017   REFERRING PHYSICIAN: Malissa Villagomez MD  RETURN PHYSICIAN APPOINTMENT: 2017     INITIAL ASSESSMENT:  Ms. Juan M Dunham presents with decreased functional use, strength and range of motion of her left hand, wrist and upper extremity that is affecting her independence with activities of daily living and ability to perform job tasks. I feel that Ms. Juan M Dunham will benefit from skilled occupational therapy to maximize the functional use of her hand, wrist and upper extremity in daily activities and work tasks. PLAN OF CARE:   PROBLEM LIST:  1. Pain in left hand and wrist.  2. Decreased motion in left thumb and wrist.  3. Decreased strength in left hand. INTERVENTIONS PLANNED:  1. Modalities that may include fluidotherapy, paraffin, ultrasound, and light therapy. 2. Therapeutic exercise including a home exercise program.  3. Manual therapy. 4. Therapeutic activities. TREATMENT PLAN:  Effective Dates: 2017 TO 2017. Frequency/Duration: 2 times a week for 6 weeks  GOALS: (Goals have been discussed and agreed upon with patient.)  Short-Term Functional Goals: Time Frame: 4 weeks  1. Decrease pain to 6 to allow patient to perform self care tasks. 2. Increase motion in left thumb and wrist by 5 degrees to improve functional use of upper extremity in ADL activities.   3. Increase strength in left hand by 5 pounds to allow patient to  and lift objects during self care activities. Discharge Goals: Time Frame: 12 weeks  1. Decrease pain to 2 to allow patient to perform all household and work tasks. 2. Increase motion in left thumb and wrist by 10 degreees to allow patient to perform all ADL activities. 3. Increase strength in left hand by 8 pounds to allow patient to , lift, hold, and carry heavy objects. Rehabilitation Potential For Stated Goals: Good  Regarding [facility] therapy, I certify that the treatment plan above will be carried out by a therapist or under their direction. Thank you for this referral,  Odessa Arias OT       Referring Physician Signature: Peggy Victoria MD _________________________  Date _________            The information in this section was collected on 9/18/2017 (except where otherwise noted). OCCUPATIONAL PROFILE & HISTORY:   History of Present Injury/Illness (Reason for Referral): The patient began having increasing pain in her left thumb and wrist over the last 5 months from doing repetitive motion at work. Past Medical History/Comorbidities:   Ms. Kristan Bowles  has a past medical history of Anxiety; Depression; Endometriosis; and Uterine fibroid. Ms. Kristan Bowles  has a past surgical history that includes negrita and bso. Social History/Living Environment:      Prior Level of Function/Work/Activity:  Independent  Dominant Side:         RIGHT    Current Medications:    Current Outpatient Prescriptions:     VITAMIN E/QUININE SULFATE (LEG CRAMP TREATMENT PO), Take  by mouth daily. , Disp: , Rfl:     ORGAN CONCENTRATES (ADRENAL PO), Take  by mouth daily. , Disp: , Rfl:    Date Last Reviewed: 10/11/2017    Number of medical conditions (excluding presenting problem) that affect the Plan of Care: Brief history (0):  LOW COMPLEXITY   ASSESSMENT OF OCCUPATIONAL PERFORMANCE:   RANGE OF MOTION:     · AROM: Left thumb motions as follows:MP 0/65, IP 0/35, abduction 35, extension 40 degrees. STRENGTH:   STRENGTH: Right 35 lbs. Left 38 lbs. LAT PINCH: Right 9 lbs. Left 11 lbs. : 3 JAW JADON : Right 10 lbs. Left 12 lbs. SENSATION:  Intact           Physical Skills Involved:  1. Range of Motion  2. Strength  3. Pain (Chronic) Cognitive Skills Affected (resulting in the inability to perform in a timely and safe manner): 1. none Psychosocial Skills Affected:  1. none   Number of elements that affect the Plan of Care: 1-3:  LOW COMPLEXITY   CLINICAL DECISION MAKING:   Outcome Measure: Tool Used: Disabilities of the Arm, Shoulder and Hand (DASH) Questionnaire - Quick Version  Score:  Initial: 33/55  Most Recent: X/55 (Date: -- )   Interpretation of Score: The DASH is designed to measure the activities of daily living in person's with upper extremity dysfunction or pain. Each section is scored on a 1-5 scale, 5 representing the greatest disability. The scores of each section are added together for a total score of 55. Score 11 12-19 20-28 29-37 38-45 46-54 55   Modifier CH CI CJ CK CL CM CN     ? Carrying, Moving, and Handling Objects:     - CURRENT STATUS: CK - 40%-59% impaired, limited or restricted    - GOAL STATUS: CJ - 20%-39% impaired, limited or restricted    - D/C STATUS:  ---------------To be determined---------------      Medical Necessity:   · Patient is expected to demonstrate progress in strength and range of motion to decrease assistance required with ADL,household and work activities. .  Reason for Services/Other Comments:  · Patient has limited motion,strength and function in her left U.E..  Clinical Decision-Making Assessment:     Clinical Decision-Making: LOW COMPLEXITY   TREATMENT:   (In addition to Assessment/Re-Assessment sessions the following treatments were rendered)  Pre-treatment Symptoms/Complaints:  Pain and stiffness in left hand and wrist.  Pain: Initial: Pain Intensity 1: 3  Pain Location 1: Hand, Wrist  Pain Orientation 1: Left  Post Session:  2     Patient was instructed in a home exercise program.  Patient stated \"I am not to sore today. Betito Tucker \"    Manual Therapy: (Soft Tissue Mobilization Duration  Duration: 15 Minutes  Duration: 15 Minutes): Technique: Retrograde massage (followed by Light tx)  LUE Soft Tissue Mobilization: Yes  Technique: Retrograde massage   Therapeutic Exercise:                                                                               : The patient's home exercise program was reviewed. Date:  10/12/17 Date:  9/25/17 Date:  10/2/17 Date:  10/5/17 Date:  10/11/17   Activity/Exercise Parameters Parameters Parameters Parameters Parameters   AROM during Fluidotherapy 20 min 20 min 20 min 20 min 20 min   Paraffin with Stretch   15 min 15 min 15 min 15 min 15 min   Retrograde massage, Friction Scar massage, Joint Mobilization   15 min  Light tx 15 min 15 min  Light tx 15 min  Light tx 15 min  Light tx   Scarf Curl   2 min 2 min 2 min 2 min 2 min   Washer Game 2 min 3 min 3 min 3 min 3 min   Individual Gripper 20 reps      20 reps   Hand [place]   20 reps    20 reps   Cones          Pegs          Clothes Pins 15 reps      15 reps   A-R Bar          Exerstick          Velcro-Roll          A&ROM EX 5 min 5 min 5 min 5 min 5 min   RESISTIVE EXERCISES Weight/ Sets/Reps   Weight/ Sets/Reps Weight/ Sets/Reps Weight/ Sets/Reps Weight/ Sets/Reps   WEIGHT WELL        Sup/Pro        UD/RD        Wrist Flex/Ext        Free Weights          UBE(Minutes)          Nautilus        Compound Row        Vertical Chest        Overhead Press                    HEP: As above; handouts given to patient for all exercises.     Therapeutic Modalities:         Left Wrist Heat  Type: Paraffin bath  Duration: 15 minutes  Patient Position: Sitting                                     Joint Mobilization:        Treatment Times:  · Therapeutic Exercise: 30 minutes  · Manual Therapy: 15 minutes  · Parafin: 15 minutes  · Whirlpool:  minutes  · Other:  minutes       Treatment/Session Assessment:    · Response to Treatment:  Patients tolerated treatment well with no complications. Upon completion of treatment, skin condition was normal..  · Compliance with Program/Exercises: Will assess as treatment progresses. · Recommendations/Intent for next treatment session: \"Next visit will focus on advancements to more challenging activities\". .Will continue per MD.\"  Total Treatment Duration:  OT Patient Time In/Time Out  Time In: 0430  Time Out: [address] [facility], OT

## 2017-10-16 ENCOUNTER — HOSPITAL ENCOUNTER (OUTPATIENT)
Dept: PHYSICAL THERAPY | Age: 57
Discharge: HOME | End: 2017-10-16
Payer: OTHER MISCELLANEOUS

## 2017-10-16 PROCEDURE — 97110 THERAPEUTIC EXERCISES: CPT

## 2017-10-16 PROCEDURE — 97018 PARAFFIN BATH THERAPY: CPT

## 2017-10-16 PROCEDURE — 97140 MANUAL THERAPY 1/> REGIONS: CPT

## 2017-10-17 NOTE — PROGRESS NOTES
Melania Rina  : 1960 [facility] [place] [address] [facility], [address], [address] [facility]  [phone]   [phone]         OUTPATIENT OCCUPATIONAL THERAPY: Daily Note 10/16/2017    ICD-10: Treatment Diagnosis: Pain in left hand (M79.642)Pain in left wrist (M25.532)  Precautions/Allergies:   Review of patient's allergies indicates no known allergies. Fall Risk Score: 0 (? 5 = High Risk)  MD Orders: Evaluate and treat;Stretching and strengthening left thumb and wrist MEDICAL/REFERRING DIAGNOSIS:   Radial styloid tenosynovitis (de quervain) [M65.4]   DATE OF ONSET: 2017   REFERRING PHYSICIAN: Evaristo Velazquez MD  RETURN PHYSICIAN APPOINTMENT: 2017     INITIAL ASSESSMENT:  Ms. Precious Soliman presents with decreased functional use, strength and range of motion of her left hand, wrist and upper extremity that is affecting her independence with activities of daily living and ability to perform job tasks. I feel that Ms. Precious Soliman will benefit from skilled occupational therapy to maximize the functional use of her hand, wrist and upper extremity in daily activities and work tasks. PLAN OF CARE:   PROBLEM LIST:  1. Pain in left hand and wrist.  2. Decreased motion in left thumb and wrist.  3. Decreased strength in left hand. INTERVENTIONS PLANNED:  1. Modalities that may include fluidotherapy, paraffin, ultrasound, and light therapy. 2. Therapeutic exercise including a home exercise program.  3. Manual therapy. 4. Therapeutic activities. TREATMENT PLAN:  Effective Dates: 2017 TO 2017. Frequency/Duration: 2 times a week for 6 weeks  GOALS: (Goals have been discussed and agreed upon with patient.)  Short-Term Functional Goals: Time Frame: 4 weeks  1. Decrease pain to 6 to allow patient to perform self care tasks. 2. Increase motion in left thumb and wrist by 5 degrees to improve functional use of upper extremity in ADL activities.   3. Increase strength in left hand by 5 pounds to allow patient to  and lift objects during self care activities. Discharge Goals: Time Frame: 12 weeks  1. Decrease pain to 2 to allow patient to perform all household and work tasks. 2. Increase motion in left thumb and wrist by 10 degreees to allow patient to perform all ADL activities. 3. Increase strength in left hand by 8 pounds to allow patient to , lift, hold, and carry heavy objects. Rehabilitation Potential For Stated Goals: Good  [facility], I certify that the treatment plan above will be carried out by a therapist or under their direction. Thank you for this referral,  Wing Roldan, OT       Referring Physician Signature: Henny Gar MD _________________________  Date _________            The information in this section was collected on 9/18/2017 (except where otherwise noted). OCCUPATIONAL PROFILE & HISTORY:   History of Present Injury/Illness (Reason for Referral): The patient began having increasing pain in her left thumb and wrist over the last 5 months from doing repetitive motion at work. Past Medical History/Comorbidities:   Ms. Kimberly Gonzalez  has a past medical history of Anxiety; Depression; Endometriosis; and Uterine fibroid. Ms. Kimberly Gonzalez  has a past surgical history that includes negrita and bso. Social History/Living Environment:      Prior Level of Function/Work/Activity:  Independent  Dominant Side:         RIGHT    Current Medications:    Current Outpatient Prescriptions:     VITAMIN E/QUININE SULFATE (LEG CRAMP TREATMENT PO), Take  by mouth daily. , Disp: , Rfl:     ORGAN CONCENTRATES (ADRENAL PO), Take  by mouth daily. , Disp: , Rfl:    Date Last Reviewed: 10/16/2017    Number of medical conditions (excluding presenting problem) that affect the Plan of Care: Brief history (0):  LOW COMPLEXITY   ASSESSMENT OF OCCUPATIONAL PERFORMANCE:   RANGE OF MOTION:     · AROM: Left thumb motions as follows:MP 0/65, IP 0/35, abduction 35, extension 40 degrees. STRENGTH:   STRENGTH: Right 35 lbs. Left 38 lbs. LAT PINCH: Right 9 lbs. Left 11 lbs. : 3 JAW JADON : Right 10 lbs. Left 12 lbs. SENSATION:  Intact           Physical Skills Involved:  1. Range of Motion  2. Strength  3. Pain (Chronic) Cognitive Skills Affected (resulting in the inability to perform in a timely and safe manner): 1. none Psychosocial Skills Affected:  1. none   Number of elements that affect the Plan of Care: 1-3:  LOW COMPLEXITY   CLINICAL DECISION MAKING:   Outcome Measure: Tool Used: Disabilities of the Arm, Shoulder and Hand (DASH) Questionnaire - Quick Version  Score:  Initial: 33/55  Most Recent: X/55 (Date: -- )   Interpretation of Score: The DASH is designed to measure the activities of daily living in person's with upper extremity dysfunction or pain. Each section is scored on a 1-5 scale, 5 representing the greatest disability. The scores of each section are added together for a total score of 55. Score 11 12-19 20-28 29-37 38-45 46-54 55   Modifier CH CI CJ CK CL CM CN     ? Carrying, Moving, and Handling Objects:     - CURRENT STATUS: CK - 40%-59% impaired, limited or restricted    - GOAL STATUS: CJ - 20%-39% impaired, limited or restricted    - D/C STATUS:  ---------------To be determined---------------      Medical Necessity:   · Patient is expected to demonstrate progress in strength and range of motion to decrease assistance required with ADL,household and work activities. .  Reason for Services/Other Comments:  · Patient has limited motion,strength and function in her left U.E..  Clinical Decision-Making Assessment:     Clinical Decision-Making: LOW COMPLEXITY   TREATMENT:   (In addition to Assessment/Re-Assessment sessions the following treatments were rendered)  Pre-treatment Symptoms/Complaints:  Pain and stiffness in left hand and wrist.  Pain: Initial: Pain Intensity 1: 2  Pain Location 1: Hand, Wrist  Pain Orientation 1: Left  Post Session:  1     Patient was instructed in a home exercise program.  Patient stated \"I am doing better. \"    Manual Therapy: (Soft Tissue Mobilization Duration  Duration: 15 Minutes  Duration: 15 Minutes): Technique: Retrograde massage (followed by Light tx)  LUE Soft Tissue Mobilization: Yes  Technique: Retrograde massage (followed by Light tx)   Therapeutic Exercise:                                                                               : The patient's home exercise program was reviewed. Date:  10/12/17 Date:  10/16/17 Date:  10/2/17 Date:  10/5/17 Date:  10/11/17   Activity/Exercise Parameters Parameters Parameters Parameters Parameters   AROM during Fluidotherapy 20 min 20 min 20 min 20 min 20 min   Paraffin with Stretch   15 min 15 min 15 min 15 min 15 min   Retrograde massage, Friction Scar massage, Joint Mobilization   15 min  Light tx 15 min  Light tx 15 min  Light tx 15 min  Light tx 15 min  Light tx   Scarf Curl   2 min 2 min 2 min 2 min 2 min   Washer Game 2 min 3 min 3 min 3 min 3 min   Individual Gripper 20 reps   20 reps   20 reps   Hand [place]   20 reps 20 reps   20 reps   [place]          Pegs          Clothes Pins 15 reps   20 reps   15 reps   A-R Bar          Exerstick    30 reps      Velcro-Roll          A&ROM EX 5 min 2 min 5 min 5 min 5 min   RESISTIVE EXERCISES Weight/ Sets/Reps   Weight/ Sets/Reps Weight/ Sets/Reps Weight/ Sets/Reps Weight/ Sets/Reps   WEIGHT WELL        Sup/Pro        UD/RD        Wrist Flex/Ext        Free Weights          UBE(Minutes)          Nautilus        Compound Row        Vertical Chest        Overhead Press                    HEP: As above; handouts given to patient for all exercises.     Therapeutic Modalities:         Left Wrist Heat  Type: Paraffin bath  Duration: 15 minutes  Patient Position: Sitting                                     Joint Mobilization: Treatment Times:  · Therapeutic Exercise: 30 minutes  · Manual Therapy: 15 minutes  · Parafin: 15 minutes  · Whirlpool:  minutes  · Other:  minutes       Treatment/Session Assessment:    · Response to Treatment:  Patients tolerated treatment well with no complications. Upon completion of treatment, skin condition was normal..  · Compliance with Program/Exercises: Will assess as treatment progresses. · Recommendations/Intent for next treatment session: \"Next visit will focus on advancements to more challenging activities\". .Will continue per MD.\"  Total Treatment Duration:  OT Patient Time In/Time Out  Time In: 3814  Time Out: [address] [facility],

## 2017-10-18 ENCOUNTER — HOSPITAL ENCOUNTER (OUTPATIENT)
Dept: PHYSICAL THERAPY | Age: 57
Discharge: HOME | End: 2017-10-18
Payer: OTHER MISCELLANEOUS

## 2017-10-18 PROCEDURE — 97110 THERAPEUTIC EXERCISES: CPT

## 2017-10-18 PROCEDURE — 97140 MANUAL THERAPY 1/> REGIONS: CPT

## 2017-10-18 PROCEDURE — 97018 PARAFFIN BATH THERAPY: CPT

## 2017-10-19 NOTE — PROGRESS NOTES
Anurag Wallace  [facility] 1960 [facility] [place] [address] [address], [facility].  [phone]   [phone]         OUTPATIENT OCCUPATIONAL THERAPY: Daily Note 10/18/2017    ICD-10: Treatment Diagnosis: Pain in left hand (M79.642)Pain in left wrist (M25.532)  Precautions/Allergies:   Review of patient's allergies indicates no known allergies. Fall Risk Score: 0 (? 5 = High Risk)  MD Orders: Evaluate and treat;Stretching and strengthening left thumb and wrist MEDICAL/REFERRING DIAGNOSIS:   Radial styloid tenosynovitis (de quervain) [M65.4]   DATE OF ONSET: 2017   REFERRING PHYSICIAN: Kathy Nicholson MD  RETURN PHYSICIAN APPOINTMENT: 2017     INITIAL ASSESSMENT:  Ms. Pankaj Fernandez presents with decreased functional use, strength and range of motion of her left hand, wrist and upper extremity that is affecting her independence with activities of daily living and ability to perform job tasks. I feel that Ms. Pankaj Fernandez will benefit from skilled occupational therapy to maximize the functional use of her hand, wrist and upper extremity in daily activities and work tasks. PLAN OF CARE:   PROBLEM LIST:  1. Pain in left hand and wrist.  2. Decreased motion in left thumb and wrist.  3. Decreased strength in left hand. INTERVENTIONS PLANNED:  1. Modalities that may include fluidotherapy, paraffin, ultrasound, and light therapy. 2. Therapeutic exercise including a home exercise program.  3. Manual therapy. 4. Therapeutic activities. TREATMENT PLAN:  Effective Dates: 2017 TO 2017. Frequency/Duration: 2 times a week for 6 weeks  GOALS: (Goals have been discussed and agreed upon with patient.)  Short-Term Functional Goals: Time Frame: 4 weeks  1. Decrease pain to 6 to allow patient to perform self care tasks. 2. Increase motion in left thumb and wrist by 5 degrees to improve functional use of upper extremity in ADL activities.   3. Increase strength in left hand by 5 pounds to allow patient to  and lift objects during self care activities. Discharge Goals: Time Frame: 12 weeks  1. Decrease pain to 2 to allow patient to perform all household and work tasks. 2. Increase motion in left thumb and wrist by 10 degreees to allow patient to perform all ADL activities. 3. Increase strength in left hand by 8 pounds to allow patient to , lift, hold, and carry heavy objects. Rehabilitation Potential For Stated Goals: Good  Regarding Yvette Espinal therapy, I certify that the treatment plan above will be carried out by a therapist or under their direction. Thank you for this referral,  Judy Rollins, KATHY       Referring Physician Signature: Kathy Ambriz., MD _________________________  Date _________            The information in this section was collected on 9/18/2017 (except where otherwise noted). OCCUPATIONAL PROFILE & HISTORY:   History of Present Injury/Illness (Reason for Referral): The patient began having increasing pain in her left thumb and wrist over the last 5 months from doing repetitive motion at work. Past Medical History/Comorbidities:   Ms. Pankaj Fernandez  has a past medical history of Anxiety; Depression; Endometriosis; and Uterine fibroid. Ms. Pankaj Fernandez  has a past surgical history that includes negrita and bso. Social History/Living Environment:      Prior Level of Function/Work/Activity:  Independent  Dominant Side:         RIGHT    Current Medications:    Current Outpatient Prescriptions:     VITAMIN E/QUININE SULFATE (LEG CRAMP TREATMENT PO), Take  by mouth daily. , Disp: , Rfl:     ORGAN CONCENTRATES (ADRENAL PO), Take  by mouth daily. , Disp: , Rfl:    Date Last Reviewed: 10/16/2017    Number of medical conditions (excluding presenting problem) that affect the Plan of Care: Brief history (0):  LOW COMPLEXITY   ASSESSMENT OF OCCUPATIONAL PERFORMANCE:   RANGE OF MOTION:     · AROM: Left thumb motions as follows:MP 0/65, IP 0/35, abduction 35, extension 40 degrees. STRENGTH:   STRENGTH: Right 35 lbs. Left 38 lbs. LAT PINCH: Right 9 lbs. Left 11 lbs. : 3 JAW JADON : Right 10 lbs. Left 12 lbs. SENSATION:  Intact           Physical Skills Involved:  1. Range of Motion  2. Strength  3. Pain (Chronic) Cognitive Skills Affected (resulting in the inability to perform in a timely and safe manner): 1. none Psychosocial Skills Affected:  1. none   Number of elements that affect the Plan of Care: 1-3:  LOW COMPLEXITY   CLINICAL DECISION MAKING:   Outcome Measure: Tool Used: Disabilities of the Arm, Shoulder and Hand (DASH) Questionnaire - Quick Version  Score:  Initial: 33/55  Most Recent: X/55 (Date: -- )   Interpretation of Score: The DASH is designed to measure the activities of daily living in person's with upper extremity dysfunction or pain. Each section is scored on a 1-5 scale, 5 representing the greatest disability. The scores of each section are added together for a total score of 55. Score 11 12-19 20-28 29-37 38-45 46-54 55   Modifier CH CI CJ CK CL CM CN     ? Carrying, Moving, and Handling Objects:     - CURRENT STATUS: CK - 40%-59% impaired, limited or restricted    - GOAL STATUS: CJ - 20%-39% impaired, limited or restricted    - D/C STATUS:  ---------------To be determined---------------      Medical Necessity:   · Patient is expected to demonstrate progress in strength and range of motion to decrease assistance required with ADL,household and work activities. .  Reason for Services/Other Comments:  · Patient has limited motion,strength and function in her left U.E..  Clinical Decision-Making Assessment:     Clinical Decision-Making: LOW COMPLEXITY   TREATMENT:   (In addition to Assessment/Re-Assessment sessions the following treatments were rendered)  Pre-treatment Symptoms/Complaints:  Pain and stiffness in left hand and wrist.  Pain: Initial: Pain Intensity 1: 2  Pain Location 1: Hand, Wrist  Pain Orientation 1: Left  Post Session:  1     Patient was instructed in a home exercise program.  Patient stated \"My pain is decreasing. [place] \"    Manual Therapy: (Soft Tissue Mobilization Duration  Duration: 15 Minutes  Duration: 15 Minutes): Technique: Retrograde massage (followed by Light tx)  LUE Soft Tissue Mobilization: Yes  Technique: Retrograde massage   Therapeutic Exercise:                                                                               : The patient's home exercise program was reviewed. Date:  10/12/17 Date:  10/16/17 Date:  10/18/17 Date:  10/5/17 Date:  10/11/17   Activity/Exercise Parameters Parameters Parameters Parameters Parameters   AROM during Fluidotherapy 20 min 20 min 20 min 20 min 20 min   Paraffin with Stretch   15 min 15 min 15 min 15 min 15 min   Retrograde massage, Friction Scar massage, Joint Mobilization   15 min  Light tx 15 min  Light tx 15 min  Light tx 15 min  Light tx 15 min  Light tx   Scarf Curl   2 min 2 min 2 min 2 min 2 min   Washer Game 2 min 3 min 3 min 3 min 3 min   Individual Gripper 20 reps   20 reps 20 reps  20 reps   Hand [place]   20 reps 20 reps 20 reps  20 reps   Cones          Pegs          Clothes Pins 15 reps   20 reps 20 reps  15 reps   A-R Bar          Exerstick    30 reps 30 reps     Velcro-Roll          A&ROM EX 5 min 2 min 2 min 5 min 5 min   RESISTIVE EXERCISES Weight/ Sets/Reps   Weight/ Sets/Reps Weight/ Sets/Reps Weight/ Sets/Reps Weight/ Sets/Reps   WEIGHT WELL        Sup/Pro        UD/RD        Wrist Flex/Ext        Free Weights          UBE(Minutes)          Nautilus        Compound Row        Vertical Chest        Overhead Press                    HEP: As above; handouts given to patient for all exercises.     Therapeutic Modalities:         Left Wrist Heat  Type: Paraffin bath  Duration: 15 minutes  Patient Position: Sitting                                     Joint Mobilization:        Treatment Times:  · Therapeutic Exercise: 30 minutes  · Manual Therapy: 15 minutes  · Parafin: 15 minutes  · Whirlpool:  minutes  · Other:  minutes       Treatment/Session Assessment:    · Response to Treatment:  Patients tolerated treatment well with no complications. Upon completion of treatment, skin condition was normal..  · Compliance with Program/Exercises: Will assess as treatment progresses. · Recommendations/Intent for next treatment session: \"Next visit will focus on advancements to more challenging activities\". .Will continue per MD.\"  Total Treatment Duration:  OT Patient Time In/Time Out  Time In: 1265  Time Out: [address] [facility],

## 2017-10-23 ENCOUNTER — HOSPITAL ENCOUNTER (OUTPATIENT)
Dept: PHYSICAL THERAPY | Age: 57
Discharge: HOME | End: 2017-10-23
Payer: OTHER MISCELLANEOUS

## 2017-10-23 PROCEDURE — 97018 PARAFFIN BATH THERAPY: CPT

## 2017-10-23 PROCEDURE — 97110 THERAPEUTIC EXERCISES: CPT

## 2017-10-23 PROCEDURE — 97140 MANUAL THERAPY 1/> REGIONS: CPT

## 2017-10-23 NOTE — PROGRESS NOTES
Haydee Patterson  : 1960 [facility] [place] [address] [facility], [address], [place] [facility]  [phone]   [phone]         OUTPATIENT OCCUPATIONAL THERAPY: Daily Note 10/23/2017     ICD-10: Treatment Diagnosis: Pain in left hand (M79.642)Pain in left wrist (M25.532)  Precautions/Allergies:   Review of patient's allergies indicates no known allergies. Fall Risk Score: 0 (? 5 = High Risk)  MD Orders: Evaluate and treat;Stretching and strengthening left thumb and wrist MEDICAL/REFERRING DIAGNOSIS:   Radial styloid tenosynovitis (de quervain) [M65.4]   DATE OF ONSET: 2017   REFERRING PHYSICIAN: Henny Gar MD  RETURN PHYSICIAN APPOINTMENT: 2017     INITIAL ASSESSMENT:  Ms. Kimberly Gonzalez presents with decreased functional use, strength and range of motion of her left hand, wrist and upper extremity that is affecting her independence with activities of daily living and ability to perform job tasks. I feel that Ms. Kimberly Gonzalez will benefit from skilled occupational therapy to maximize the functional use of her hand, wrist and upper extremity in daily activities and work tasks. PLAN OF CARE:   PROBLEM LIST:  1. Pain in left hand and wrist.  2. Decreased motion in left thumb and wrist.  3. Decreased strength in left hand. INTERVENTIONS PLANNED:  1. Modalities that may include fluidotherapy, paraffin, ultrasound, and light therapy. 2. Therapeutic exercise including a home exercise program.  3. Manual therapy. 4. Therapeutic activities. TREATMENT PLAN:  Effective Dates: 2017 TO 2017. Frequency/Duration: 2 times a week for 6 weeks  GOALS: (Goals have been discussed and agreed upon with patient.)  Short-Term Functional Goals: Time Frame: 4 weeks  1. Decrease pain to 6 to allow patient to perform self care tasks. 2. Increase motion in left thumb and wrist by 5 degrees to improve functional use of upper extremity in ADL activities.   3. Increase strength in left hand by 5 pounds to allow patient to  and lift objects during self care activities. Discharge Goals: Time Frame: 12 weeks  1. Decrease pain to 2 to allow patient to perform all household and work tasks. 2. Increase motion in left thumb and wrist by 10 degreees to allow patient to perform all ADL activities. 3. Increase strength in left hand by 8 pounds to allow patient to , lift, hold, and carry heavy objects. Rehabilitation Potential For Stated Goals: Good  Regarding [facility] therapy, I certify that the treatment plan above will be carried out by a therapist or under their direction. Thank you for this referral,  Roselia Rosales OT       Referring Physician Signature: Lukas Jackson MD _________________________  Date _________            The information in this section was collected on 9/18/2017 (except where otherwise noted). OCCUPATIONAL PROFILE & HISTORY:   History of Present Injury/Illness (Reason for Referral): The patient began having increasing pain in her left thumb and wrist over the last 5 months from doing repetitive motion at work. Past Medical History/Comorbidities:   Ms. Leon Jauregui  has a past medical history of Anxiety; Depression; Endometriosis; and Uterine fibroid. Ms. Leon Jauregui  has a past surgical history that includes negrita and bso. Social History/Living Environment:      Prior Level of Function/Work/Activity:  Independent  Dominant Side:         RIGHT    Current Medications:    Current Outpatient Prescriptions:     VITAMIN E/QUININE SULFATE (LEG CRAMP TREATMENT PO), Take  by mouth daily. , Disp: , Rfl:     ORGAN CONCENTRATES (ADRENAL PO), Take  by mouth daily. , Disp: , Rfl:    Date Last Reviewed: 10/23/2017     Number of medical conditions (excluding presenting problem) that affect the Plan of Care: Brief history (0):  LOW COMPLEXITY   ASSESSMENT OF OCCUPATIONAL PERFORMANCE:   RANGE OF MOTION:     · AROM: Left thumb motions as follows:MP 0/65, IP 0/35, abduction 35, extension 40 degrees. STRENGTH:   STRENGTH: Right 35 lbs. Left 38 lbs. LAT PINCH: Right 9 lbs. Left 11 lbs. : 3 JAW JADON : Right 10 lbs. Left 12 lbs. SENSATION:  Intact           Physical Skills Involved:  1. Range of Motion  2. Strength  3. Pain (Chronic) Cognitive Skills Affected (resulting in the inability to perform in a timely and safe manner): 1. none Psychosocial Skills Affected:  1. none   Number of elements that affect the Plan of Care: 1-3:  LOW COMPLEXITY   CLINICAL DECISION MAKING:   Outcome Measure: Tool Used: Disabilities of the Arm, Shoulder and Hand (DASH) Questionnaire - Quick Version  Score:  Initial: 33/55  Most Recent: X/55 (Date: -- )   Interpretation of Score: The DASH is designed to measure the activities of daily living in person's with upper extremity dysfunction or pain. Each section is scored on a 1-5 scale, 5 representing the greatest disability. The scores of each section are added together for a total score of 55. Score 11 12-19 20-28 29-37 38-45 46-54 55   Modifier CH CI CJ CK CL CM CN     ? Carrying, Moving, and Handling Objects:     - CURRENT STATUS: CK - 40%-59% impaired, limited or restricted    - GOAL STATUS: CJ - 20%-39% impaired, limited or restricted    - D/C STATUS:  ---------------To be determined---------------      Medical Necessity:   · Patient is expected to demonstrate progress in strength and range of motion to decrease assistance required with ADL,household and work activities. .  Reason for Services/Other Comments:  · Patient has limited motion,strength and function in her left U.E..  Clinical Decision-Making Assessment:     Clinical Decision-Making: LOW COMPLEXITY   TREATMENT:   (In addition to Assessment/Re-Assessment sessions the following treatments were rendered)  Pre-treatment Symptoms/Complaints:  Pain and stiffness in left hand and wrist.  Pain: Initial: Pain Intensity 1: 2  Pain Location 1: Hand, Wrist  Pain Orientation 1: Left  Post Session:  1     Patient was instructed in a home exercise program.  Patient stated \"My pain is getting less and less. [facility] \"    Manual Therapy: (Soft Tissue Mobilization Duration  Duration: 15 Minutes  Duration: 15 Minutes): Technique: Retrograde massage (followed by Light tx)  LUE Soft Tissue Mobilization: Yes  Technique: Retrograde massage   Therapeutic Exercise:                                                                               : The patient's home exercise program was reviewed. Date:  10/12/17 Date:  10/16/17 Date:  10/18/17 Date:  10/23/17 Date:  10/11/17   Activity/Exercise Parameters Parameters Parameters Parameters Parameters   AROM during Fluidotherapy 20 min 20 min 20 min 20 min 20 min   Paraffin with Stretch   15 min 15 min 15 min 15 min 15 min   Retrograde massage, Friction Scar massage, Joint Mobilization   15 min  Light tx 15 min  Light tx 15 min  Light tx 15 min  Light tx 15 min  Light tx   Scarf Curl   2 min 2 min 2 min 2 min 2 min   Washer Game 2 min 3 min 3 min 3 min 3 min   Individual Gripper 20 reps   20 reps 20 reps 20 reps 20 reps   Hand [place]   20 reps 20 reps 20 reps 20 reps 20 reps   [place]          Pegs          Clothes Pins 15 reps   20 reps 20 reps 20 reps 15 reps   A-R Bar          Exerstick    30 reps 30 reps 40 reps    Velcro-Roll          A&ROM EX 5 min 2 min 2 min 2 min 5 min   RESISTIVE EXERCISES Weight/ Sets/Reps   Weight/ Sets/Reps Weight/ Sets/Reps Weight/ Sets/Reps Weight/ Sets/Reps   WEIGHT WELL        Sup/Pro        UD/RD        Wrist Flex/Ext        Free Weights          UBE(Minutes)          Nautilus        Compound Row        Vertical Chest        Overhead Press                    HEP: As above; handouts given to patient for all exercises.     Therapeutic Modalities:         Left Wrist Heat  Type: Paraffin bath  Duration: 15 minutes  Patient Position: Sitting Joint Mobilization:        Treatment Times:  · Therapeutic Exercise: 30 minutes  · Manual Therapy: 15 minutes  · Parafin: 15 minutes  · Whirlpool:  minutes  · Other:  minutes       Treatment/Session Assessment:    · Response to Treatment:  Patients tolerated treatment well with no complications. Upon completion of treatment, skin condition was normal..  · Compliance with Program/Exercises: Will assess as treatment progresses. · Recommendations/Intent for next treatment session: \"Next visit will focus on advancements to more challenging activities\". .Will continue per MD.\"  Total Treatment Duration:  OT Patient Time In/Time Out  Time In: 7395  Time Out: [address], OT

## 2017-10-25 ENCOUNTER — HOSPITAL ENCOUNTER (OUTPATIENT)
Dept: PHYSICAL THERAPY | Age: 57
Discharge: HOME | End: 2017-10-25
Payer: OTHER MISCELLANEOUS

## 2017-10-25 PROCEDURE — 97140 MANUAL THERAPY 1/> REGIONS: CPT

## 2017-10-25 PROCEDURE — 97110 THERAPEUTIC EXERCISES: CPT

## 2017-10-25 PROCEDURE — 97018 PARAFFIN BATH THERAPY: CPT

## 2017-10-26 NOTE — PROGRESS NOTES
Mackenzie Horne  : 1960 [facility] [place] [address] [facility], [address], [facility] [address].  [phone]   [phone]         OUTPATIENT OCCUPATIONAL THERAPY: Daily Note 10/25/2017     ICD-10: Treatment Diagnosis: Pain in left hand (M79.642)Pain in left wrist (M25.532)  Precautions/Allergies:   Review of patient's allergies indicates no known allergies. Fall Risk Score: 0 (? 5 = High Risk)  MD Orders: Evaluate and treat;Stretching and strengthening left thumb and wrist MEDICAL/REFERRING DIAGNOSIS:   Radial styloid tenosynovitis (de quervain) [M65.4]   DATE OF ONSET: 2017   REFERRING PHYSICIAN: Mikael Antoine MD  RETURN PHYSICIAN APPOINTMENT: 10/31/2017     INITIAL ASSESSMENT:  Ms. Nataliia Crawford presents with decreased functional use, strength and range of motion of her left hand, wrist and upper extremity that is affecting her independence with activities of daily living and ability to perform job tasks. I feel that Ms. Nataliia Crawford will benefit from skilled occupational therapy to maximize the functional use of her hand, wrist and upper extremity in daily activities and work tasks. PLAN OF CARE:   PROBLEM LIST:  1. Pain in left hand and wrist.  2. Decreased motion in left thumb and wrist.  3. Decreased strength in left hand. INTERVENTIONS PLANNED:  1. Modalities that may include fluidotherapy, paraffin, ultrasound, and light therapy. 2. Therapeutic exercise including a home exercise program.  3. Manual therapy. 4. Therapeutic activities. TREATMENT PLAN:  Effective Dates: 2017 TO 2017. Frequency/Duration: 2 times a week for 6 weeks  GOALS: (Goals have been discussed and agreed upon with patient.)  Short-Term Functional Goals: Time Frame: 4 weeks  1. Decrease pain to 6 to allow patient to perform self care tasks. 2. Increase motion in left thumb and wrist by 5 degrees to improve functional use of upper extremity in ADL activities.   3. Increase strength in left hand by 5 pounds to allow patient to  and lift objects during self care activities. Discharge Goals: Time Frame: 12 weeks  1. Decrease pain to 2 to allow patient to perform all household and work tasks. 2. Increase motion in left thumb and wrist by 10 degreees to allow patient to perform all ADL activities. 3. Increase strength in left hand by 8 pounds to allow patient to , lift, hold, and carry heavy objects. Rehabilitation Potential For Stated Goals: Good  Regarding [facility] therapy, I certify that the treatment plan above will be carried out by a therapist or under their direction. Thank you for this referral,  Jerald Salamanca, OT       Referring Physician Signature: Dinora Weinstein MD _________________________  Date _________            The information in this section was collected on 9/18/2017 (except where otherwise noted). OCCUPATIONAL PROFILE & HISTORY:   History of Present Injury/Illness (Reason for Referral): The patient began having increasing pain in her left thumb and wrist over the last 5 months from doing repetitive motion at work. Past Medical History/Comorbidities:   Ms. Rhett Pugh  has a past medical history of Anxiety; Depression; Endometriosis; and Uterine fibroid. Ms. Rhett Pugh  has a past surgical history that includes negrita and bso. Social History/Living Environment:      Prior Level of Function/Work/Activity:  Independent  Dominant Side:         RIGHT    Current Medications:    Current Outpatient Prescriptions:     VITAMIN E/QUININE SULFATE (LEG CRAMP TREATMENT PO), Take  by mouth daily. , Disp: , Rfl:     ORGAN CONCENTRATES (ADRENAL PO), Take  by mouth daily. , Disp: , Rfl:    Date Last Reviewed: 10/26/2017     Number of medical conditions (excluding presenting problem) that affect the Plan of Care: Brief history (0):  LOW COMPLEXITY   ASSESSMENT OF OCCUPATIONAL PERFORMANCE:   RANGE OF MOTION:     · AROM: Left thumb motions as follows:MP 0/65, IP 0/35, abduction 35, extension 40 degrees. STRENGTH:   STRENGTH: Right 35 lbs. Left 38 lbs. LAT PINCH: Right 9 lbs. Left 11 lbs. : 3 JAW JADON : Right 10 lbs. Left 12 lbs. SENSATION:  Intact           Physical Skills Involved:  1. Range of Motion  2. Strength  3. Pain (Chronic) Cognitive Skills Affected (resulting in the inability to perform in a timely and safe manner): 1. none Psychosocial Skills Affected:  1. none   Number of elements that affect the Plan of Care: 1-3:  LOW COMPLEXITY   CLINICAL DECISION MAKING:   Outcome Measure: Tool Used: Disabilities of the Arm, Shoulder and Hand (DASH) Questionnaire - Quick Version  Score:  Initial: 33/55  Most Recent: X/55 (Date: -- )   Interpretation of Score: The DASH is designed to measure the activities of daily living in person's with upper extremity dysfunction or pain. Each section is scored on a 1-5 scale, 5 representing the greatest disability. The scores of each section are added together for a total score of 55. Score 11 12-19 20-28 29-37 38-45 46-54 55   Modifier CH CI CJ CK CL CM CN     ? Carrying, Moving, and Handling Objects:     - CURRENT STATUS: CK - 40%-59% impaired, limited or restricted    - GOAL STATUS: CJ - 20%-39% impaired, limited or restricted    - D/C STATUS:  ---------------To be determined---------------      Medical Necessity:   · Patient is expected to demonstrate progress in strength and range of motion to decrease assistance required with ADL,household and work activities. .  Reason for Services/Other Comments:  · Patient has limited motion,strength and function in her left U.E..  Clinical Decision-Making Assessment:     Clinical Decision-Making: LOW COMPLEXITY   TREATMENT:   (In addition to Assessment/Re-Assessment sessions the following treatments were rendered)  Pre-treatment Symptoms/Complaints:  Pain and stiffness in left hand and wrist.  Pain: Initial: Pain Intensity 1: 5  Pain Location 1: Hand, Wrist  Pain Orientation 1: Left  Post Session:  2     Patient was instructed in a home exercise program.  Patient stated \"My left hand has a little more pain today. [employer] [employer] \"    Manual Therapy: (Soft Tissue Mobilization Duration  Duration: 15 Minutes  Duration: 15 Minutes): Technique: Retrograde massage (followed by Light tx)  LUE Soft Tissue Mobilization: Yes  Technique: Retrograde massage   Therapeutic Exercise:                                                                               : The patient's home exercise program was reviewed. Date:  10/12/17 Date:  10/16/17 Date:  10/18/17 Date:  10/23/17 Date:  10/25/17   Activity/Exercise Parameters Parameters Parameters Parameters Parameters   AROM during Fluidotherapy 20 min 20 min 20 min 20 min 20 min   Paraffin with Stretch   15 min 15 min 15 min 15 min 15 min   Retrograde massage, Friction Scar massage, Joint Mobilization   15 min  Light tx 15 min  Light tx 15 min  Light tx 15 min  Light tx 15 min  Light tx   Scarf Curl   2 min 2 min 2 min 2 min 2 min   Washer Game 2 min 3 min 3 min 3 min 3 min   Individual Gripper 20 reps   20 reps 20 reps 20 reps 20 reps   Hand [place]   20 reps 20 reps 20 reps 20 reps 20 reps   [place]          Pegs          Clothes Pins 15 reps   20 reps 20 reps 20 reps 15 reps   A-R Bar          Exerstick    30 reps 30 reps 40 reps 40 reps   Velcro-Roll          A&ROM EX 5 min 2 min 2 min 2 min 2 min   RESISTIVE EXERCISES Weight/ Sets/Reps   Weight/ Sets/Reps Weight/ Sets/Reps Weight/ Sets/Reps Weight/ Sets/Reps   WEIGHT WELL        Sup/Pro        UD/RD        Wrist Flex/Ext        Free Weights          UBE(Minutes)          Nautilus        Compound Row        Vertical Chest        Overhead Press                    HEP: As above; handouts given to patient for all exercises.     Therapeutic Modalities:         Left Wrist Heat  Type: Fluidotherapy (while perfoming AROM ex)  Duration: 20 minutes  Patient Position: Sitting                                     Joint Mobilization:        Treatment Times:  · Therapeutic Exercise: 30 minutes  · Manual Therapy: 15 minutes  · Parafin: 15 minutes  · Whirlpool:  minutes  · Other:  minutes       Treatment/Session Assessment:    · Response to Treatment:  Patients tolerated treatment well with no complications. Upon completion of treatment, skin condition was normal..  · Compliance with Program/Exercises: Will assess as treatment progresses. · Recommendations/Intent for next treatment session: \"Next visit will focus on advancements to more challenging activities\". .Will continue per MD.\"  Total Treatment Duration:  OT Patient Time In/Time Out  Time In: 5361  Time Out: [address] [facility],

## 2017-10-30 ENCOUNTER — HOSPITAL ENCOUNTER (OUTPATIENT)
Dept: PHYSICAL THERAPY | Age: 57
Discharge: HOME | End: 2017-10-30
Payer: OTHER MISCELLANEOUS

## 2017-10-30 PROCEDURE — 97140 MANUAL THERAPY 1/> REGIONS: CPT

## 2017-10-30 PROCEDURE — 97018 PARAFFIN BATH THERAPY: CPT

## 2017-10-30 PROCEDURE — 97110 THERAPEUTIC EXERCISES: CPT

## 2017-11-07 ENCOUNTER — HOSPITAL ENCOUNTER (OUTPATIENT)
Dept: PHYSICAL THERAPY | Age: 57
Discharge: HOME | End: 2017-11-07
Payer: OTHER MISCELLANEOUS

## 2017-11-07 PROCEDURE — 97140 MANUAL THERAPY 1/> REGIONS: CPT

## 2017-11-07 PROCEDURE — 97110 THERAPEUTIC EXERCISES: CPT

## 2017-11-07 PROCEDURE — 97018 PARAFFIN BATH THERAPY: CPT

## 2017-11-08 ENCOUNTER — APPOINTMENT (OUTPATIENT)
Dept: PHYSICAL THERAPY | Age: 57
End: 2017-11-08
Payer: OTHER MISCELLANEOUS

## 2017-11-08 NOTE — PROGRESS NOTES
Rylan Chung  : 1960 [facility] [address], [address], [facility] [address].  [phone]   [phone]         OUTPATIENT OCCUPATIONAL THERAPY: Daily Note 2017     ICD-10: Treatment Diagnosis: Pain in left hand (M79.642)Pain in left wrist (M25.532)  Precautions/Allergies:   Review of patient's allergies indicates no known allergies. Fall Risk Score: 0 (? 5 = High Risk)  MD Orders: Evaluate and treat;Stretching and strengthening left thumb and wrist MEDICAL/REFERRING DIAGNOSIS:   Radial styloid tenosynovitis (de quervain) [M65.4]   DATE OF ONSET: 2017   REFERRING PHYSICIAN: Obdulio Cook MD  RETURN PHYSICIAN APPOINTMENT: 10/31/2017     INITIAL ASSESSMENT:  Ms. Danica Nieves presents with decreased functional use, strength and range of motion of her left hand, wrist and upper extremity that is affecting her independence with activities of daily living and ability to perform job tasks. I feel that Ms. Danica Nieves will benefit from skilled occupational therapy to maximize the functional use of her hand, wrist and upper extremity in daily activities and work tasks. PLAN OF CARE:   PROBLEM LIST:  1. Pain in left hand and wrist.  2. Decreased motion in left thumb and wrist.  3. Decreased strength in left hand. INTERVENTIONS PLANNED:  1. Modalities that may include fluidotherapy, paraffin, ultrasound, and light therapy. 2. Therapeutic exercise including a home exercise program.  3. Manual therapy. 4. Therapeutic activities. TREATMENT PLAN:  Effective Dates: 2017 TO 2017. Frequency/Duration: 2 times a week for 6 weeks  GOALS: (Goals have been discussed and agreed upon with patient.)  Short-Term Functional Goals: Time Frame: 4 weeks  1. Decrease pain to 6 to allow patient to perform self care tasks. 2. Increase motion in left thumb and wrist by 5 degrees to improve functional use of upper extremity in ADL activities.   3. Increase strength in left hand by 5 pounds to allow patient to  and lift objects during self care activities. Discharge Goals: Time Frame: 12 weeks  1. Decrease pain to 2 to allow patient to perform all household and work tasks. 2. Increase motion in left thumb and wrist by 10 degreees to allow patient to perform all ADL activities. 3. Increase strength in left hand by 8 pounds to allow patient to , lift, hold, and carry heavy objects. Rehabilitation Potential For Stated Goals: Good  Regarding Yvette Espinal therapy, I certify that the treatment plan above will be carried out by a therapist or under their direction. Thank you for this referral,  Judy Rollins, KATHY       Referring Physician Signature: Kathy Ambriz., MD _________________________  Date _________            The information in this section was collected on 9/18/2017 (except where otherwise noted). OCCUPATIONAL PROFILE & HISTORY:   History of Present Injury/Illness (Reason for Referral): The patient began having increasing pain in her left thumb and wrist over the last 5 months from doing repetitive motion at work. Past Medical History/Comorbidities:   Ms. Pankaj Fernandez  has a past medical history of Anxiety; Depression; Endometriosis; and Uterine fibroid. Ms. Pankaj Fernandez  has a past surgical history that includes negrita and bso. Social History/Living Environment:      Prior Level of Function/Work/Activity:  Independent  Dominant Side:         RIGHT    Current Medications:    Current Outpatient Prescriptions:     VITAMIN E/QUININE SULFATE (LEG CRAMP TREATMENT PO), Take  by mouth daily. , Disp: , Rfl:     ORGAN CONCENTRATES (ADRENAL PO), Take  by mouth daily. , Disp: , Rfl:    Date Last Reviewed: 11/7/2017     Number of medical conditions (excluding presenting problem) that affect the Plan of Care: Brief history (0):  LOW COMPLEXITY   ASSESSMENT OF OCCUPATIONAL PERFORMANCE:   RANGE OF MOTION:     · AROM: Left thumb motions as follows:MP 0/65, IP 0/35, abduction 35, extension 40 degrees. STRENGTH:   STRENGTH: Right 35 lbs. Left 38 lbs. LAT PINCH: Right 9 lbs. Left 11 lbs. : 3 JAW JADON : Right 10 lbs. Left 12 lbs. SENSATION:  Intact           Physical Skills Involved:  1. Range of Motion  2. Strength  3. Pain (Chronic) Cognitive Skills Affected (resulting in the inability to perform in a timely and safe manner): 1. none Psychosocial Skills Affected:  1. none   Number of elements that affect the Plan of Care: 1-3:  LOW COMPLEXITY   CLINICAL DECISION MAKING:   Outcome Measure: Tool Used: Disabilities of the Arm, Shoulder and Hand (DASH) Questionnaire - Quick Version  Score:  Initial: 33/55  Most Recent: X/55 (Date: -- )   Interpretation of Score: The DASH is designed to measure the activities of daily living in person's with upper extremity dysfunction or pain. Each section is scored on a 1-5 scale, 5 representing the greatest disability. The scores of each section are added together for a total score of 55. Score 11 12-19 20-28 29-37 38-45 46-54 55   Modifier CH CI CJ CK CL CM CN     ? Carrying, Moving, and Handling Objects:     - CURRENT STATUS: CK - 40%-59% impaired, limited or restricted    - GOAL STATUS: CJ - 20%-39% impaired, limited or restricted    - D/C STATUS:  ---------------To be determined---------------      Medical Necessity:   · Patient is expected to demonstrate progress in strength and range of motion to decrease assistance required with ADL,household and work activities. .  Reason for Services/Other Comments:  · Patient has limited motion,strength and function in her left U.E..  Clinical Decision-Making Assessment:     Clinical Decision-Making: LOW COMPLEXITY   TREATMENT:   (In addition to Assessment/Re-Assessment sessions the following treatments were rendered)  Pre-treatment Symptoms/Complaints:  Pain and stiffness in left hand and wrist.  Pain: Initial: Pain Intensity 1: 7  Pain Location 1: Hand, Wrist  Pain Orientation 1: Left  Post Session:  3     Patient was instructed in a home exercise program.  Patient stated \"I am sore today ,work was really busy today. Erica Mckeon \"    Manual Therapy: (Soft Tissue Mobilization Duration  Duration: 15 Minutes  Duration: 15 Minutes): Technique: Retrograde massage (followed by Light tx)  LUE Soft Tissue Mobilization: Yes  Technique: Retrograde massage (followed by Light tx)   Therapeutic Exercise:                                                                               : The patient's home exercise program was reviewed. Date:  10/30/17 Date:  11/7/17 Date:  10/18/17 Date:  10/23/17 Date:  10/25/17   Activity/Exercise Parameters Parameters Parameters Parameters Parameters   AROM during Fluidotherapy 20 min 20 min 20 min 20 min 20 min   Paraffin with Stretch   15 min 15 min 15 min 15 min 15 min   Retrograde massage, Friction Scar massage, Joint Mobilization   15 min  Light tx 15 min  Light tx 15 min  Light tx 15 min  Light tx 15 min  Light tx   Scarf Curl   2 min 2 min 2 min 2 min 2 min   Washer Game 2 min 3 min 3 min 3 min 3 min   Individual Gripper 20 reps   20 reps 20 reps 20 reps 20 reps   Hand [place]   20 reps 20 reps 20 reps 20 reps 20 reps   Cones          Pegs          Clothes Pins 15 reps   20 reps 20 reps 20 reps 15 reps   A-R Bar          Exerstick   30 reps 30 reps 30 reps 40 reps 40 reps   Velcro-Roll          A&ROM EX 2 min 2 min 2 min 2 min 2 min   RESISTIVE EXERCISES Weight/ Sets/Reps   Weight/ Sets/Reps Weight/ Sets/Reps Weight/ Sets/Reps Weight/ Sets/Reps   WEIGHT WELL        Sup/Pro        UD/RD        Wrist Flex/Ext        Free Weights          UBE(Minutes)          Nautilus        Compound Row        Vertical Chest        Overhead Press                    HEP: As above; handouts given to patient for all exercises.     Therapeutic Modalities:         Left Wrist Heat  Type: Paraffin bath  Duration: 15 minutes  Patient Position: Sitting                                     Joint Mobilization:        Treatment Times:  · Therapeutic Exercise: 30 minutes  · Manual Therapy: 15 minutes  · Parafin: 15 minutes  · Whirlpool:  minutes  · Other:  minutes       Treatment/Session Assessment:    · Response to Treatment:  Patients tolerated treatment well with no complications. Upon completion of treatment, skin condition was normal..  · Compliance with Program/Exercises: Will assess as treatment progresses. · Recommendations/Intent for next treatment session: \"Next visit will focus on advancements to more challenging activities\". .Will continue per MD.\"  Total Treatment Duration:  OT Patient Time In/Time Out  Time In: 0430  Time Out: [address] [facility],

## 2017-11-09 ENCOUNTER — HOSPITAL ENCOUNTER (OUTPATIENT)
Dept: PHYSICAL THERAPY | Age: 57
Discharge: HOME | End: 2017-11-09
Payer: OTHER MISCELLANEOUS

## 2017-11-09 PROCEDURE — 97018 PARAFFIN BATH THERAPY: CPT

## 2017-11-09 PROCEDURE — 97110 THERAPEUTIC EXERCISES: CPT

## 2017-11-09 PROCEDURE — 97140 MANUAL THERAPY 1/> REGIONS: CPT

## 2017-11-10 NOTE — PROGRESS NOTES
Amadeo Velazquez  : 1960 [facility] at [place] [address] [facility], [address], [facility] [address].  [phone]   [phone]         OUTPATIENT OCCUPATIONAL THERAPY: Daily Note 2017     ICD-10: Treatment Diagnosis: Pain in left hand (M79.642)Pain in left wrist (M25.532)  Precautions/Allergies:   Review of patient's allergies indicates no known allergies. Fall Risk Score: 0 (? 5 = High Risk)  MD Orders: Evaluate and treat;Stretching and strengthening left thumb and wrist MEDICAL/REFERRING DIAGNOSIS:   Radial styloid tenosynovitis (de quervain) [M65.4]   DATE OF ONSET: 2017   REFERRING PHYSICIAN: Marco Mejia MD  RETURN PHYSICIAN APPOINTMENT: 10/31/2017     INITIAL ASSESSMENT:  Ms. Nomi Garcia presents with decreased functional use, strength and range of motion of her left hand, wrist and upper extremity that is affecting her independence with activities of daily living and ability to perform job tasks. I feel that Ms. Nomi Garcia will benefit from skilled occupational therapy to maximize the functional use of her hand, wrist and upper extremity in daily activities and work tasks. PLAN OF CARE:   PROBLEM LIST:  1. Pain in left hand and wrist.  2. Decreased motion in left thumb and wrist.  3. Decreased strength in left hand. INTERVENTIONS PLANNED:  1. Modalities that may include fluidotherapy, paraffin, ultrasound, and light therapy. 2. Therapeutic exercise including a home exercise program.  3. Manual therapy. 4. Therapeutic activities. TREATMENT PLAN:  Effective Dates: 2017 TO 2017. Frequency/Duration: 2 times a week for 6 weeks  GOALS: (Goals have been discussed and agreed upon with patient.)  Short-Term Functional Goals: Time Frame: 4 weeks  1. Decrease pain to 6 to allow patient to perform self care tasks. 2. Increase motion in left thumb and wrist by 5 degrees to improve functional use of upper extremity in ADL activities.   3. Increase strength in left hand by 5 pounds to allow patient to  and lift objects during self care activities. Discharge Goals: Time Frame: 12 weeks  1. Decrease pain to 2 to allow patient to perform all household and work tasks. 2. Increase motion in left thumb and wrist by 10 degreees to allow patient to perform all ADL activities. 3. Increase strength in left hand by 8 pounds to allow patient to , lift, hold, and carry heavy objects. Rehabilitation Potential For Stated Goals: Good  Regarding Rodney Hunter therapy, I certify that the treatment plan above will be carried out by a therapist or under their direction. Thank you for this referral,  Katie Fragoso, OT       Referring Physician Signature: Brendon Velez., MD _________________________  Date _________            The information in this section was collected on 9/18/2017 (except where otherwise noted). OCCUPATIONAL PROFILE & HISTORY:   History of Present Injury/Illness (Reason for Referral): The patient began having increasing pain in her left thumb and wrist over the last 5 months from doing repetitive motion at work. Past Medical History/Comorbidities:   Ms. Judith Puga  has a past medical history of Anxiety; Depression; Endometriosis; and Uterine fibroid. Ms. Judith Puga  has a past surgical history that includes negrita and bso. Social History/Living Environment:      Prior Level of Function/Work/Activity:  Independent  Dominant Side:         RIGHT    Current Medications:    Current Outpatient Prescriptions:     VITAMIN E/QUININE SULFATE (LEG CRAMP TREATMENT PO), Take  by mouth daily. , Disp: , Rfl:     ORGAN CONCENTRATES (ADRENAL PO), Take  by mouth daily. , Disp: , Rfl:    Date Last Reviewed: 11/7/2017     Number of medical conditions (excluding presenting problem) that affect the Plan of Care: Brief history (0):  LOW COMPLEXITY   ASSESSMENT OF OCCUPATIONAL PERFORMANCE:   RANGE OF MOTION:     · AROM: Left thumb motions as follows:MP 0/65, IP 0/35, abduction 35, extension 40 degrees. STRENGTH:   STRENGTH: Right 35 lbs. Left 38 lbs. LAT PINCH: Right 9 lbs. Left 11 lbs. : 3 JAW JADON : Right 10 lbs. Left 12 lbs. SENSATION:  Intact           Physical Skills Involved:  1. Range of Motion  2. Strength  3. Pain (Chronic) Cognitive Skills Affected (resulting in the inability to perform in a timely and safe manner): 1. none Psychosocial Skills Affected:  1. none   Number of elements that affect the Plan of Care: 1-3:  LOW COMPLEXITY   CLINICAL DECISION MAKING:   Outcome Measure: Tool Used: Disabilities of the Arm, Shoulder and Hand (DASH) Questionnaire - Quick Version  Score:  Initial: 33/55  Most Recent: X/55 (Date: -- )   Interpretation of Score: The DASH is designed to measure the activities of daily living in person's with upper extremity dysfunction or pain. Each section is scored on a 1-5 scale, 5 representing the greatest disability. The scores of each section are added together for a total score of 55. Score 11 12-19 20-28 29-37 38-45 46-54 55   Modifier CH CI CJ CK CL CM CN     ? Carrying, Moving, and Handling Objects:     - CURRENT STATUS: CK - 40%-59% impaired, limited or restricted    - GOAL STATUS: CJ - 20%-39% impaired, limited or restricted    - D/C STATUS:  ---------------To be determined---------------      Medical Necessity:   · Patient is expected to demonstrate progress in strength and range of motion to decrease assistance required with ADL,household and work activities. .  Reason for Services/Other Comments:  · Patient has limited motion,strength and function in her left U.E..  Clinical Decision-Making Assessment:     Clinical Decision-Making: LOW COMPLEXITY   TREATMENT:   (In addition to Assessment/Re-Assessment sessions the following treatments were rendered)  Pre-treatment Symptoms/Complaints:  Pain and stiffness in left hand and wrist.  Pain: Initial: Pain Intensity 1: 5  Pain Location 1: Hand, Wrist  Pain Orientation 1: Left  Post Session:  3     Patient was instructed in a home exercise program.  Patient stated \"I am doing better. Suzan Riser \"    Manual Therapy: (Soft Tissue Mobilization Duration  Duration: 15 Minutes  Duration: 15 Minutes): Technique: Retrograde massage (followed by Light tx)  LUE Soft Tissue Mobilization: Yes  Technique: Retrograde massage   Therapeutic Exercise:                                                                               : The patient's home exercise program was reviewed. Date:  10/30/17 Date:  11/7/17 Date:  11/9/17 Date:  10/23/17 Date:  10/25/17   Activity/Exercise Parameters Parameters Parameters Parameters Parameters   AROM during Fluidotherapy 20 min 20 min 20 min 20 min 20 min   Paraffin with Stretch   15 min 15 min 15 min 15 min 15 min   Retrograde massage, Friction Scar massage, Joint Mobilization   15 min  Light tx 15 min  Light tx 15 min  Light tx 15 min  Light tx 15 min  Light tx   Scarf Curl   2 min 2 min 2 min 2 min 2 min   Washer Game 2 min 3 min 3 min 3 min 3 min   Individual Gripper 20 reps   20 reps 20 reps 20 reps 20 reps   Hand [place]   20 reps 20 reps 20 reps 20 reps 20 reps   Cones          Pegs          Clothes Pins 15 reps   20 reps 20 reps 20 reps 15 reps   A-R Bar          Exerstick   30 reps 30 reps 30 reps 40 reps 40 reps   Velcro-Roll          A&ROM EX 2 min 2 min 2 min 2 min 2 min   RESISTIVE EXERCISES Weight/ Sets/Reps   Weight/ Sets/Reps Weight/ Sets/Reps Weight/ Sets/Reps Weight/ Sets/Reps   WEIGHT WELL        Sup/Pro        UD/RD        Wrist Flex/Ext        Free Weights          UBE(Minutes)          Nautilus        Compound Row        Vertical Chest        Overhead Press                    HEP: As above; handouts given to patient for all exercises.     Therapeutic Modalities:         Left Wrist Heat  Type: Paraffin bath  Duration: 15 minutes  Patient Position: Sitting Joint Mobilization:        Treatment Times:  · Therapeutic Exercise: 30 minutes  · Manual Therapy: 15 minutes  · Parafin: 15 minutes  · Whirlpool:  minutes  · Other:  minutes       Treatment/Session Assessment:    · Response to Treatment:  Patients tolerated treatment well with no complications. Upon completion of treatment, skin condition was normal..  · Compliance with Program/Exercises: Will assess as treatment progresses. · Recommendations/Intent for next treatment session: \"Next visit will focus on advancements to more challenging activities\". .Will continue per MD.\"  Total Treatment Duration:  OT Patient Time In/Time Out  Time In: 0115  Time Out: KATHY Khan

## 2017-11-13 ENCOUNTER — APPOINTMENT (OUTPATIENT)
Dept: PHYSICAL THERAPY | Age: 57
End: 2017-11-13
Payer: OTHER MISCELLANEOUS

## 2017-11-15 ENCOUNTER — APPOINTMENT (OUTPATIENT)
Dept: PHYSICAL THERAPY | Age: 57
End: 2017-11-15
Payer: OTHER MISCELLANEOUS

## 2017-11-20 ENCOUNTER — APPOINTMENT (OUTPATIENT)
Dept: PHYSICAL THERAPY | Age: 57
End: 2017-11-20
Payer: OTHER MISCELLANEOUS

## 2017-11-22 ENCOUNTER — APPOINTMENT (OUTPATIENT)
Dept: PHYSICAL THERAPY | Age: 57
End: 2017-11-22
Payer: OTHER MISCELLANEOUS

## 2017-11-27 ENCOUNTER — APPOINTMENT (OUTPATIENT)
Dept: PHYSICAL THERAPY | Age: 57
End: 2017-11-27
Payer: OTHER MISCELLANEOUS

## 2017-11-29 ENCOUNTER — APPOINTMENT (OUTPATIENT)
Dept: PHYSICAL THERAPY | Age: 57
End: 2017-11-29
Payer: OTHER MISCELLANEOUS

## 2018-02-28 NOTE — THERAPY DISCHARGE
Jackeline Garica  : 1960 [facility] [address], [address], [place] [address]  [phone]   [phone]         OUTPATIENT OCCUPATIONAL THERAPY: Discharge      ICD-10: Treatment Diagnosis: Pain in left hand (M79.642)Pain in left wrist (M25.532)  Precautions/Allergies:   Review of patient's allergies indicates no known allergies. Fall Risk Score: 0 (? 5 = High Risk)  MD Orders: Evaluate and treat;Stretching and strengthening left thumb and wrist MEDICAL/REFERRING DIAGNOSIS:   Radial styloid tenosynovitis (de quervain) [M65.4]   DATE OF ONSET: 2017   REFERRING PHYSICIAN: Kina Hernandez MD  RETURN PHYSICIAN APPOINTMENT: 10/31/2017     INITIAL ASSESSMENT:  Ms. Óscar Bruce presents with decreased functional use, strength and range of motion of her left hand, wrist and upper extremity that is affecting her independence with activities of daily living and ability to perform job tasks. I feel that Ms. Óscar Bruce will benefit from skilled occupational therapy to maximize the functional use of her hand, wrist and upper extremity in daily activities and work tasks. PLAN OF CARE:   PROBLEM LIST:  1. Pain in left hand and wrist.  2. Decreased motion in left thumb and wrist.  3. Decreased strength in left hand. INTERVENTIONS PLANNED:  1. Modalities that may include fluidotherapy, paraffin, ultrasound, and light therapy. 2. Therapeutic exercise including a home exercise program.  3. Manual therapy. 4. Therapeutic activities. TREATMENT PLAN:  Effective Dates: 2017 TO 2017. Frequency/Duration: 2 times a week for 6 weeks  GOALS: (Goals have been discussed and agreed upon with patient.)  Short-Term Functional Goals: Time Frame: 4 weeks  1. Decrease pain to 6 to allow patient to perform self care tasks. ( GOAL MET MOST OF THE TIME )  2.  Increase motion in left thumb and wrist by 5 degrees to improve functional use of upper extremity in ADL activities. ( GOAL MET )  3. Increase strength in left hand by 5 pounds to allow patient to  and lift objects during self care activities. ( GOAL MET )  Discharge Goals: Time Frame: 12 weeks  1. Decrease pain to 2 to allow patient to perform all household and work tasks. ( GOAL NOT MET )  2. Increase motion in left thumb and wrist by 10 degreees to allow patient to perform all ADL activities. ( GOAL MET )  3. Increase strength in left hand by 8 pounds to allow patient to , lift, hold, and carry heavy objects. ( GOAL MET )  Rehabilitation Potential For Stated Goals: Good  Regarding Nely Ean Wiggins's therapy, I certify that the treatment plan above will be carried out by a therapist or under their direction. Thank you for this referral,  Chhaya Michelle OT       Referring Physician Signature: Justus Smith MD _________________________  Date _________            The information in this section was collected on 9/18/2017 (except where otherwise noted). OCCUPATIONAL PROFILE & HISTORY:   History of Present Injury/Illness (Reason for Referral): The patient began having increasing pain in her left thumb and wrist over the last 5 months from doing repetitive motion at work. Past Medical History/Comorbidities:   Ms. Yamile Darnell  has a past medical history of Anxiety; Depression; Endometriosis; and Uterine fibroid. Ms. Yamile Darnell  has a past surgical history that includes hx negrita and bso. Social History/Living Environment:      Prior Level of Function/Work/Activity:  Independent  Dominant Side:         RIGHT    Current Medications:    Current Outpatient Prescriptions:     VITAMIN E/QUININE SULFATE (LEG CRAMP TREATMENT PO), Take  by mouth daily. , Disp: , Rfl:     ORGAN CONCENTRATES (ADRENAL PO), Take  by mouth daily. , Disp: , Rfl:    Date Last Reviewed: 11/7/2017     Number of medical conditions (excluding presenting problem) that affect the Plan of Care: Brief history (0):  LOW COMPLEXITY   ASSESSMENT OF OCCUPATIONAL PERFORMANCE:   RANGE OF MOTION:     · AROM: Left thumb motions as follows:MP 0/65, IP 0/35, abduction 60, extension 55 degrees. STRENGTH:   STRENGTH: Right 35 lbs. Left 38 lbs. LAT PINCH: Right 9 lbs. Left 11 lbs. : 3 JAW JADON : Right 10 lbs. Left 12 lbs. SENSATION:  Intact           Physical Skills Involved:  1. Range of Motion  2. Strength  3. Pain (Chronic) Cognitive Skills Affected (resulting in the inability to perform in a timely and safe manner): 1. none Psychosocial Skills Affected:  1. none   Number of elements that affect the Plan of Care: 1-3:  LOW COMPLEXITY   CLINICAL DECISION MAKING:   Outcome Measure: Tool Used: Disabilities of the Arm, Shoulder and Hand (DASH) Questionnaire - Quick Version  Score:  Initial: 33/55  Most Recent: X/55 (Date: -- )   Interpretation of Score: The DASH is designed to measure the activities of daily living in person's with upper extremity dysfunction or pain. Each section is scored on a 1-5 scale, 5 representing the greatest disability. The scores of each section are added together for a total score of 55. Score 11 12-19 20-28 29-37 38-45 46-54 55   Modifier CH CI CJ CK CL CM CN     ? Carrying, Moving, and Handling Objects:     - CURRENT STATUS: CK - 40%-59% impaired, limited or restricted    - GOAL STATUS: CJ - 20%-39% impaired, limited or restricted    - D/C STATUS:  ---------------To be determined---------------      Medical Necessity:   · Patient is expected to demonstrate progress in strength and range of motion to decrease assistance required with ADL,household and work activities. .  Reason for Services/Other Comments:  · Patient has limited motion,strength and function in her left U.E..  Clinical Decision-Making Assessment:     Clinical Decision-Making: LOW COMPLEXITY   TREATMENT:   ·        Treatment/Session Assessment:    · Response to Treatment:  Patients tolerated treatment well with no complications. Upon completion of treatment, skin condition was normal..  · Compliance with Program/Exercises: Will assess as treatment progresses. · Recommendations/Intent for next treatment session: \"To discharge. .\"        Sanya Aquino, OT

## 2018-08-10 ENCOUNTER — HOSPITAL ENCOUNTER (OUTPATIENT)
Dept: MAMMOGRAPHY | Age: 58
Discharge: HOME | End: 2018-08-10
Attending: FAMILY MEDICINE
Payer: COMMERCIAL

## 2018-08-10 DIAGNOSIS — Z12.31 VISIT FOR SCREENING MAMMOGRAM: ICD-10-CM

## 2018-08-10 PROCEDURE — 77067 SCR MAMMO BI INCL CAD: CPT

## 2018-12-17 ENCOUNTER — HOSPITAL ENCOUNTER (EMERGENCY)
Age: 58
Discharge: HOME | End: 2018-12-17
Attending: EMERGENCY MEDICINE
Payer: COMMERCIAL

## 2018-12-17 VITALS
TEMPERATURE: 98.2 F | BODY MASS INDEX: 19.32 KG/M2 | RESPIRATION RATE: 16 BRPM | WEIGHT: 105 LBS | HEART RATE: 64 BPM | HEIGHT: 62 IN | SYSTOLIC BLOOD PRESSURE: 125 MMHG | DIASTOLIC BLOOD PRESSURE: 68 MMHG | OXYGEN SATURATION: 99 %

## 2018-12-17 DIAGNOSIS — R42 DIZZINESS: Primary | ICD-10-CM

## 2018-12-17 LAB
ANION GAP SERPL CALC-SCNC: 10 MMOL/L
BUN SERPL-MCNC: 10 MG/DL (ref 6–23)
CALCIUM SERPL-MCNC: 9 MG/DL (ref 8.3–10.4)
CHLORIDE SERPL-SCNC: 103 MMOL/L (ref 98–107)
CO2 SERPL-SCNC: 25 MMOL/L (ref 21–32)
CREAT SERPL-MCNC: 0.8 MG/DL (ref 0.6–1)
ERYTHROCYTE [DISTWIDTH] IN BLOOD BY AUTOMATED COUNT: 14.2 % (ref 11.9–14.6)
GLUCOSE SERPL-MCNC: 97 MG/DL (ref 65–100)
HCT VFR BLD AUTO: 37.7 % (ref 35.8–46.3)
HGB BLD-MCNC: 12.4 G/DL (ref 11.7–15.4)
MAGNESIUM SERPL-MCNC: 1.8 MG/DL (ref 1.8–2.4)
MCH RBC QN AUTO: 30 PG (ref 26.1–32.9)
MCHC RBC AUTO-ENTMCNC: 32.9 G/DL (ref 31.4–35)
MCV RBC AUTO: 91.3 FL (ref 79.6–97.8)
NRBC # BLD: 0 K/UL (ref 0–0.2)
PHOSPHATE SERPL-MCNC: 2.7 MG/DL (ref 2.5–4.5)
PLATELET # BLD AUTO: 219 K/UL (ref 150–450)
PMV BLD AUTO: 9.9 FL (ref 9.4–12.3)
POTASSIUM SERPL-SCNC: 3.6 MMOL/L (ref 3.5–5.1)
RBC # BLD AUTO: 4.13 M/UL (ref 4.05–5.2)
SODIUM SERPL-SCNC: 138 MMOL/L (ref 136–145)
WBC # BLD AUTO: 4.6 K/UL (ref 4.3–11.1)

## 2018-12-17 PROCEDURE — 99283 EMERGENCY DEPT VISIT LOW MDM: CPT | Performed by: EMERGENCY MEDICINE

## 2018-12-17 PROCEDURE — 85027 COMPLETE CBC AUTOMATED: CPT

## 2018-12-17 PROCEDURE — 83735 ASSAY OF MAGNESIUM: CPT

## 2018-12-17 PROCEDURE — 84100 ASSAY OF PHOSPHORUS: CPT

## 2018-12-17 PROCEDURE — 80048 BASIC METABOLIC PNL TOTAL CA: CPT

## 2018-12-17 RX ORDER — SODIUM CHLORIDE 0.9 % (FLUSH) 0.9 %
5-10 SYRINGE (ML) INJECTION AS NEEDED
Status: DISCONTINUED | OUTPATIENT
Start: 2018-12-17 | End: 2018-12-17 | Stop reason: HOSPADM

## 2018-12-17 RX ORDER — SODIUM CHLORIDE 0.9 % (FLUSH) 0.9 %
5-10 SYRINGE (ML) INJECTION EVERY 8 HOURS
Status: DISCONTINUED | OUTPATIENT
Start: 2018-12-17 | End: 2018-12-17 | Stop reason: HOSPADM

## 2018-12-17 RX ORDER — ESCITALOPRAM OXALATE 20 MG/1
30 TABLET ORAL DAILY
COMMUNITY

## 2018-12-17 NOTE — ED TRIAGE NOTES
Patient arrives with EMS for an episode of dizziness. Per EMS dizziness resolved. Patient had an increase in her Lexapro 3 weeks ago.

## 2018-12-17 NOTE — ED NOTES
I have reviewed discharge instructions with the patient. The patient verbalized understanding. Patient left ED via Discharge Method: ambulatory to Home with niece. Opportunity for questions and clarification provided. Patient given 0 scripts. To continue your aftercare when you leave the hospital, you may receive an automated call from our care team to check in on how you are doing. This is a free service and part of our promise to provide the best care and service to meet your aftercare needs.  If you have questions, or wish to unsubscribe from this service please call [phone]. Thank you for Choosing our [facility] Emergency Department.

## 2018-12-17 NOTE — DISCHARGE INSTRUCTIONS
Return with any fevers, vomiting, chest pain, worsening symptoms, or additional concerns. Follow-up with your primary care doctor for reevaluation as needed.

## 2018-12-17 NOTE — ED PROVIDER NOTES
50-year-old lady presents with concerns about an episode of dizziness that occurred while at work to smoking. Patient says this is happened to her many times in the past.  It is usually associated with electrolyte abnormalities. She reports that previously she had been told to drink less fluid because it affected her sodium level. She said that she had been trying to do that until the past week or 2 when she increased her fluid again. About 3 weeks ago. Her Lexapro dose was increased and she said it is given her a try mouth. Because of that she has been trying to stay more hydrated. She reports that when she got to work this morning, she started to get a little lightheaded and dizzy and so she called EMS. She did not eat any breakfast and says she normally does not eat breakfast until 9:30. EMS reported that her blood sugar was approximately 91. Patient's that she currently has no chest pain, difficult breathing, headache, weakness, numbness, nausea, vomiting, or diarrhea. She said she does feel a little jittery. Elements of this note were created using speech recognition software. As such, errors of speech recognition may be present.              Past Medical History:   Diagnosis Date    Anxiety     Depression     Endometriosis     Uterine fibroid        Past Surgical History:   Procedure Laterality Date    HX ANJUM AND BSO           Family History:   Problem Relation Age of Onset    Cancer Mother         breast    Breast Cancer Mother     Breast Cancer Maternal Aunt        Social History     Socioeconomic History    Marital status: SINGLE     Spouse name: Not on file    Number of children: Not on file    Years of education: Not on file    Highest education level: Not on file   Social Needs    Financial resource strain: Not on file    Food insecurity - worry: Not on file    Food insecurity - inability: Not on file    Transportation needs - medical: Not on file   [employer] needs - non-medical: Not on file   Occupational History    Not on file   Tobacco Use    Smoking status: Never Smoker   Substance and Sexual Activity    Alcohol use: No    Drug use: No    Sexual activity: Not on file   Other Topics Concern    Not on file   Social History Narrative    Not on file         ALLERGIES: Other medication    Review of Systems   Constitutional: Negative for chills, diaphoresis and fever. HENT: Negative for congestion, rhinorrhea and sore throat. Eyes: Negative for redness and visual disturbance. Respiratory: Negative for cough, chest tightness, shortness of breath and wheezing. Cardiovascular: Negative for chest pain and palpitations. Gastrointestinal: Negative for abdominal pain, blood in stool, diarrhea, nausea and vomiting. Endocrine: Negative for polydipsia and polyuria. Genitourinary: Negative for dysuria and hematuria. Musculoskeletal: Negative for arthralgias, myalgias and neck stiffness. Skin: Negative for rash. Allergic/Immunologic: Negative for environmental allergies and food allergies. Neurological: Negative for dizziness, weakness and headaches. Hematological: Negative for adenopathy. Does not bruise/bleed easily. Psychiatric/Behavioral: Negative for confusion and sleep disturbance. The patient is nervous/anxious. Vitals:    12/17/18 0847 12/17/18 0916   BP: (!) 142/91    Pulse: 70    Resp: 16    Temp: 98.2 °F (36.8 °C)    SpO2: 99% 99%   Weight: 47.6 kg (105 lb)    Height: 5' 2\" (1.575 m)             Physical Exam   Constitutional: She is oriented to person, place, and time. She appears well-developed and well-nourished. HENT:   Head: Normocephalic and atraumatic. Mouth/Throat: Oropharynx is clear and moist.   Eyes: Conjunctivae are normal. Pupils are equal, round, and reactive to light. Right eye exhibits no discharge. Left eye exhibits no discharge. Neck: No thyromegaly present.    Cardiovascular: Normal rate, regular rhythm and normal heart sounds. No murmur heard. Pulmonary/Chest: Effort normal and breath sounds normal.   Abdominal: Soft. Bowel sounds are normal. There is no tenderness. There is no rebound and no guarding. Musculoskeletal: Normal range of motion. She exhibits no edema. Neurological: She is alert and oriented to person, place, and time. She exhibits normal muscle tone. Coordination normal.   Skin: Skin is warm and dry. Psychiatric: She has a normal mood and affect.  Her behavior is normal.        MDM  Number of Diagnoses or Management Options  Diagnosis management comments: I will check her basic electrolytes         Procedures

## 2018-12-17 NOTE — LETTER
[facility] EMERGENCY DEPT 
[facility] [address] [place] [id number] 
[phone] Work/School Note Date: 12/17/2018 To Whom It May concern: 
 
Rachel Stein was seen and treated today in the emergency room by the following provider(s): 
Attending Provider: Shaila Pandey MD. Rachel Stein please excuse her from work on Monday. Sincerely, Moo Ward MD

## 2019-04-05 ENCOUNTER — HOSPITAL ENCOUNTER (OUTPATIENT)
Dept: OCCUPATIONAL MEDICINE | Age: 59
Discharge: HOME | End: 2019-04-05

## 2019-04-05 DIAGNOSIS — T14.90XA INJURY: ICD-10-CM

## 2019-09-06 ENCOUNTER — HOSPITAL ENCOUNTER (OUTPATIENT)
Dept: MAMMOGRAPHY | Age: 59
Discharge: HOME | End: 2019-09-06
Payer: COMMERCIAL

## 2019-09-06 DIAGNOSIS — Z12.39 BREAST CANCER SCREENING: ICD-10-CM

## 2019-09-06 PROCEDURE — 77063 BREAST TOMOSYNTHESIS BI: CPT

## 2020-01-17 ENCOUNTER — HOSPITAL ENCOUNTER (OUTPATIENT)
Dept: MAMMOGRAPHY | Age: 60
Discharge: HOME | End: 2020-01-17
Payer: COMMERCIAL

## 2020-01-17 DIAGNOSIS — M85.89 OSTEOPENIA OF MULTIPLE SITES: ICD-10-CM

## 2020-01-17 PROCEDURE — 77080 DXA BONE DENSITY AXIAL: CPT

## 2020-09-08 ENCOUNTER — HOSPITAL ENCOUNTER (OUTPATIENT)
Dept: MAMMOGRAPHY | Age: 60
Discharge: HOME | End: 2020-09-08
Attending: NURSE PRACTITIONER
Payer: COMMERCIAL

## 2020-09-08 DIAGNOSIS — Z12.31 OTHER SCREENING MAMMOGRAM: ICD-10-CM

## 2020-09-08 PROCEDURE — 77063 BREAST TOMOSYNTHESIS BI: CPT

## 2021-08-16 ENCOUNTER — TRANSCRIBE ORDER (OUTPATIENT)
Dept: SCHEDULING | Age: 61
End: 2021-08-16

## 2021-08-16 DIAGNOSIS — Z12.31 SCREENING MAMMOGRAM FOR HIGH-RISK PATIENT: Primary | ICD-10-CM

## 2021-09-10 ENCOUNTER — HOSPITAL ENCOUNTER (OUTPATIENT)
Dept: MAMMOGRAPHY | Age: 61
Discharge: HOME | End: 2021-09-10
Attending: FAMILY MEDICINE
Payer: COMMERCIAL

## 2021-09-10 DIAGNOSIS — Z12.31 SCREENING MAMMOGRAM FOR HIGH-RISK PATIENT: ICD-10-CM

## 2021-09-10 PROCEDURE — 77063 BREAST TOMOSYNTHESIS BI: CPT

## 2022-10-07 ENCOUNTER — HOSPITAL ENCOUNTER (OUTPATIENT)
Dept: MAMMOGRAPHY | Age: 62
Discharge: HOME | End: 2022-10-10
Payer: COMMERCIAL

## 2022-10-07 DIAGNOSIS — Z12.31 OTHER SCREENING MAMMOGRAM: ICD-10-CM

## 2022-10-07 PROCEDURE — 77063 BREAST TOMOSYNTHESIS BI: CPT

## 2023-05-10 RX ORDER — ESCITALOPRAM OXALATE 20 MG/1
30 TABLET ORAL DAILY
COMMUNITY

## 2023-10-13 ENCOUNTER — HOSPITAL ENCOUNTER (OUTPATIENT)
Dept: MAMMOGRAPHY | Age: 63
Discharge: HOME | End: 2023-10-13
Payer: COMMERCIAL

## 2023-10-13 VITALS — WEIGHT: 94 LBS | BODY MASS INDEX: 18.46 KG/M2 | HEIGHT: 60 IN

## 2023-10-13 DIAGNOSIS — Z12.31 VISIT FOR SCREENING MAMMOGRAM: ICD-10-CM

## 2023-10-13 PROCEDURE — 77063 BREAST TOMOSYNTHESIS BI: CPT

## 2023-10-31 ENCOUNTER — HOSPITAL ENCOUNTER (EMERGENCY)
Age: 63
Discharge: HOME | End: 2023-10-31
Attending: EMERGENCY MEDICINE
Payer: COMMERCIAL

## 2023-10-31 VITALS
HEART RATE: 99 BPM | SYSTOLIC BLOOD PRESSURE: 154 MMHG | DIASTOLIC BLOOD PRESSURE: 82 MMHG | WEIGHT: 9 LBS | TEMPERATURE: 98.4 F | BODY MASS INDEX: 1.77 KG/M2 | OXYGEN SATURATION: 100 % | RESPIRATION RATE: 19 BRPM | HEIGHT: 60 IN

## 2023-10-31 DIAGNOSIS — F15.93 CAFFEINE WITHDRAWAL (HCC): Primary | ICD-10-CM

## 2023-10-31 PROCEDURE — 99282 EMERGENCY DEPT VISIT SF MDM: CPT

## 2023-10-31 RX ORDER — BUSPIRONE HYDROCHLORIDE 15 MG/1
15 TABLET ORAL 2 TIMES DAILY
COMMUNITY
Start: 2023-08-30

## 2023-10-31 ASSESSMENT — ENCOUNTER SYMPTOMS
BACK PAIN: 0
COUGH: 0
COLOR CHANGE: 0
ABDOMINAL PAIN: 0
RHINORRHEA: 0
SHORTNESS OF BREATH: 0
DIARRHEA: 0
NAUSEA: 0
VOMITING: 0

## 2023-10-31 ASSESSMENT — PAIN DESCRIPTION - LOCATION: LOCATION: GENERALIZED

## 2023-10-31 ASSESSMENT — PAIN SCALES - GENERAL: PAINLEVEL_OUTOF10: 10

## 2023-10-31 ASSESSMENT — PAIN - FUNCTIONAL ASSESSMENT: PAIN_FUNCTIONAL_ASSESSMENT: 0-10

## 2023-10-31 NOTE — ED PROVIDER NOTES
Emergency Department Provider Note       PCP: CONNER Botello NP   Age: 59 y.o. Sex: female     DISPOSITION Decision To Discharge 10/31/2023 02:05:53 AM       ICD-10-CM    1. 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             Discussion of management or test interpretation. Patient felt not to be experiencing any life-threatening symptoms at this time. Patient reassured that caffeine withdrawal was not life-threatening but could be fairly symptomatic and was instructed on gradual reduction of caffeine. Risk of Complications and/or Morbidity of Patient Management:  Shared medical decision making was utilized in creating the patients health plan today. History       Patient reports she has gotten herself messed up and has been drinking too much caffeine for several months. Today she tried to cut back significantly and has headache and anxiety and some muscle cramps. Patient is concerned that caffeine withdrawal could be life-threatening. She denies chest pain, trouble breathing, numbness tingling or weakness. Review of Systems   Constitutional:  Negative for chills and fever. HENT:  Negative for congestion and rhinorrhea. Respiratory:  Negative for cough and shortness of breath. Cardiovascular:  Negative for chest pain and leg swelling. Gastrointestinal:  Negative for abdominal pain, diarrhea, nausea and vomiting. Endocrine: Negative for polydipsia and polyuria. Genitourinary:  Negative for dysuria, frequency and hematuria. Musculoskeletal:  Positive for myalgias. Negative for back pain. Skin:  Negative for color change and rash. Neurological:  Positive for headaches. Negative for weakness and numbness. Psychiatric/Behavioral:  The patient is nervous/anxious.     All other systems reviewed and are

## 2023-10-31 NOTE — DISCHARGE INSTRUCTIONS
Gradually reduce your caffeine consumption of energy drinks and coffee over a 7 to 10-day period. 1-1/2 energy drinks and 1-1/2 cups of coffee for 2 days then 1 energy drink and 1 cup of coffee for 2 days then a half of an energy drink and a half a cup of coffee for 2 days then 0 energy drinks and half a cup of coffee for 2 days then stop all caffeine. See your doctor in 7 to 10 days if not improving. Return here if any new, worsening or concerning symptoms.

## 2023-10-31 NOTE — ED TRIAGE NOTES
Pt ambulatory to triage. Pt reports she usually drinks two Celsius a day and a couple cups of coffee. Pt reports she had one Celsius this morning and 2 cups of coffee. Pt states she \"does not feel right\" and feels like she is going through caffeine withdrawals. Pt CO headache, shakiness, and diarrhea.

## 2025-03-21 ENCOUNTER — HOSPITAL ENCOUNTER (OUTPATIENT)
Dept: MAMMOGRAPHY | Age: 65
Discharge: HOME | End: 2025-03-24
Payer: COMMERCIAL

## 2025-03-21 VITALS — BODY MASS INDEX: 17.77 KG/M2 | WEIGHT: 91 LBS

## 2025-03-21 DIAGNOSIS — Z12.31 OTHER SCREENING MAMMOGRAM: ICD-10-CM

## 2025-03-21 PROCEDURE — 77063 BREAST TOMOSYNTHESIS BI: CPT
